# Patient Record
Sex: FEMALE | Race: WHITE | NOT HISPANIC OR LATINO | Employment: OTHER
[De-identification: names, ages, dates, MRNs, and addresses within clinical notes are randomized per-mention and may not be internally consistent; named-entity substitution may affect disease eponyms.]

---

## 2017-01-17 ENCOUNTER — RECORDS - HEALTHEAST (OUTPATIENT)
Dept: ADMINISTRATIVE | Facility: OTHER | Age: 57
End: 2017-01-17

## 2017-01-17 ENCOUNTER — RECORDS - HEALTHEAST (OUTPATIENT)
Dept: LAB | Facility: CLINIC | Age: 57
End: 2017-01-17

## 2017-01-17 LAB
CHOLEST SERPL-MCNC: 178 MG/DL
FASTING STATUS PATIENT QL REPORTED: NORMAL
HDLC SERPL-MCNC: 56 MG/DL
LDLC SERPL CALC-MCNC: 104 MG/DL
TRIGL SERPL-MCNC: 90 MG/DL

## 2017-01-31 ENCOUNTER — RECORDS - HEALTHEAST (OUTPATIENT)
Dept: ADMINISTRATIVE | Facility: OTHER | Age: 57
End: 2017-01-31

## 2017-09-05 ENCOUNTER — RECORDS - HEALTHEAST (OUTPATIENT)
Dept: ADMINISTRATIVE | Facility: OTHER | Age: 57
End: 2017-09-05

## 2017-11-15 ENCOUNTER — RECORDS - HEALTHEAST (OUTPATIENT)
Dept: ADMINISTRATIVE | Facility: OTHER | Age: 57
End: 2017-11-15

## 2017-12-06 ENCOUNTER — RECORDS - HEALTHEAST (OUTPATIENT)
Dept: LAB | Facility: CLINIC | Age: 57
End: 2017-12-06

## 2017-12-06 ENCOUNTER — RECORDS - HEALTHEAST (OUTPATIENT)
Dept: ADMINISTRATIVE | Facility: OTHER | Age: 57
End: 2017-12-06

## 2017-12-06 LAB
CHOLEST SERPL-MCNC: 202 MG/DL
FASTING STATUS PATIENT QL REPORTED: NO
HDLC SERPL-MCNC: 59 MG/DL
LDLC SERPL CALC-MCNC: 114 MG/DL
TRIGL SERPL-MCNC: 144 MG/DL

## 2018-01-02 ENCOUNTER — RECORDS - HEALTHEAST (OUTPATIENT)
Dept: ADMINISTRATIVE | Facility: OTHER | Age: 58
End: 2018-01-02

## 2018-04-27 ENCOUNTER — RECORDS - HEALTHEAST (OUTPATIENT)
Dept: ADMINISTRATIVE | Facility: OTHER | Age: 58
End: 2018-04-27

## 2018-04-30 ENCOUNTER — RECORDS - HEALTHEAST (OUTPATIENT)
Dept: ADMINISTRATIVE | Facility: OTHER | Age: 58
End: 2018-04-30

## 2018-05-03 ENCOUNTER — RECORDS - HEALTHEAST (OUTPATIENT)
Dept: ADMINISTRATIVE | Facility: OTHER | Age: 58
End: 2018-05-03

## 2018-05-11 ENCOUNTER — HOSPITAL ENCOUNTER (OUTPATIENT)
Dept: CARDIOLOGY | Facility: CLINIC | Age: 58
Discharge: HOME | End: 2018-05-11

## 2018-05-11 ENCOUNTER — HOSPITAL ENCOUNTER (OUTPATIENT)
Dept: NUCLEAR MEDICINE | Facility: CLINIC | Age: 58
Discharge: HOME | End: 2018-05-11

## 2018-05-11 ENCOUNTER — RECORDS - HEALTHEAST (OUTPATIENT)
Dept: ADMINISTRATIVE | Facility: OTHER | Age: 58
End: 2018-05-11

## 2018-05-11 DIAGNOSIS — R07.9 CHEST PAIN, UNSPECIFIED TYPE: ICD-10-CM

## 2018-05-11 LAB
CV STRESS CURRENT BP HE: NORMAL
CV STRESS CURRENT HR HE: 101
CV STRESS CURRENT HR HE: 104
CV STRESS CURRENT HR HE: 112
CV STRESS CURRENT HR HE: 119
CV STRESS CURRENT HR HE: 119
CV STRESS CURRENT HR HE: 124
CV STRESS CURRENT HR HE: 132
CV STRESS CURRENT HR HE: 137
CV STRESS CURRENT HR HE: 137
CV STRESS CURRENT HR HE: 143
CV STRESS CURRENT HR HE: 143
CV STRESS CURRENT HR HE: 144
CV STRESS CURRENT HR HE: 144
CV STRESS CURRENT HR HE: 145
CV STRESS CURRENT HR HE: 148
CV STRESS CURRENT HR HE: 66
CV STRESS CURRENT HR HE: 75
CV STRESS CURRENT HR HE: 78
CV STRESS CURRENT HR HE: 79
CV STRESS CURRENT HR HE: 79
CV STRESS CURRENT HR HE: 81
CV STRESS CURRENT HR HE: 87
CV STRESS CURRENT HR HE: 89
CV STRESS CURRENT HR HE: 93
CV STRESS CURRENT HR HE: 98
CV STRESS DEVIATION TIME HE: NORMAL
CV STRESS ECHO PERCENT HR HE: NORMAL
CV STRESS EXERCISE STAGE HE: NORMAL
CV STRESS EXERCISE STAGE REACHED HE: NORMAL
CV STRESS FINAL RESTING BP HE: NORMAL
CV STRESS FINAL RESTING HR HE: 79
CV STRESS MAX HR HE: 148
CV STRESS MAX TREADMILL GRADE HE: 14
CV STRESS MAX TREADMILL SPEED HE: 3.4
CV STRESS PEAK DIA BP HE: NORMAL
CV STRESS PEAK SYS BP HE: NORMAL
CV STRESS PHASE HE: NORMAL
CV STRESS PROTOCOL HE: NORMAL
CV STRESS RESTING PT POSITION HE: NORMAL
CV STRESS RESTING PT POSITION HE: NORMAL
CV STRESS ST DEVIATION AMOUNT HE: NORMAL
CV STRESS ST DEVIATION ELEVATION HE: NORMAL
CV STRESS ST EVELATION AMOUNT HE: NORMAL
CV STRESS TEST TYPE HE: NORMAL
CV STRESS TOTAL STAGE TIME MIN 1 HE: NORMAL
NUC STRESS EJECTION FRACTION: 78 %
STRESS ECHO BASELINE BP: NORMAL
STRESS ECHO BASELINE HR: 65
STRESS ECHO CALCULATED PERCENT HR: 92 %
STRESS ECHO LAST STRESS BP: NORMAL
STRESS ECHO LAST STRESS HR: 145
STRESS ECHO POST ESTIMATED WORKLOAD: 7.3
STRESS ECHO POST EXERCISE DUR MIN: 6
STRESS ECHO POST EXERCISE DUR SEC: 1
STRESS ECHO TARGET HR: 137

## 2018-06-06 ENCOUNTER — RECORDS - HEALTHEAST (OUTPATIENT)
Dept: ADMINISTRATIVE | Facility: OTHER | Age: 58
End: 2018-06-06

## 2018-09-10 ENCOUNTER — RECORDS - HEALTHEAST (OUTPATIENT)
Dept: ADMINISTRATIVE | Facility: OTHER | Age: 58
End: 2018-09-10

## 2019-08-15 ENCOUNTER — RECORDS - HEALTHEAST (OUTPATIENT)
Dept: ADMINISTRATIVE | Facility: OTHER | Age: 59
End: 2019-08-15

## 2020-01-04 ENCOUNTER — RECORDS - HEALTHEAST (OUTPATIENT)
Dept: ADMINISTRATIVE | Facility: OTHER | Age: 60
End: 2020-01-04

## 2020-01-06 ENCOUNTER — COMMUNICATION - HEALTHEAST (OUTPATIENT)
Dept: SCHEDULING | Facility: CLINIC | Age: 60
End: 2020-01-06

## 2020-01-08 ENCOUNTER — RECORDS - HEALTHEAST (OUTPATIENT)
Dept: ADMINISTRATIVE | Facility: OTHER | Age: 60
End: 2020-01-08

## 2020-01-29 ENCOUNTER — OFFICE VISIT - HEALTHEAST (OUTPATIENT)
Dept: FAMILY MEDICINE | Facility: CLINIC | Age: 60
End: 2020-01-29

## 2020-01-29 DIAGNOSIS — Z12.11 SCREEN FOR COLON CANCER: ICD-10-CM

## 2020-01-29 DIAGNOSIS — L42 PITYRIASIS ROSEA: ICD-10-CM

## 2020-01-29 DIAGNOSIS — J40 BRONCHITIS: ICD-10-CM

## 2020-01-29 DIAGNOSIS — Z12.31 VISIT FOR SCREENING MAMMOGRAM: ICD-10-CM

## 2020-01-29 ASSESSMENT — MIFFLIN-ST. JEOR: SCORE: 1183.21

## 2020-01-29 NOTE — ASSESSMENT & PLAN NOTE
Underlying cause and expected course discussed.  If any itching develop she will contact clinic and will discuss options

## 2020-03-27 ENCOUNTER — COMMUNICATION - HEALTHEAST (OUTPATIENT)
Dept: FAMILY MEDICINE | Facility: CLINIC | Age: 60
End: 2020-03-27

## 2020-03-27 DIAGNOSIS — J30.1 SEASONAL ALLERGIC RHINITIS DUE TO POLLEN: ICD-10-CM

## 2020-03-27 NOTE — ASSESSMENT & PLAN NOTE
Possible asthmatic component.  Will treat allergies with the Flonase.  The cough which has been responding to albuterol will move to astep up therapy as if it is asthma.  Will check back in with patient in 2 weeks and if it is now controlled will use as working diagnosis for asthma at that time.  Given the current viral pandemic though further allergy testing at this time or spirometry is not advised thus the treatment regimen without the normal testing.

## 2020-04-09 ENCOUNTER — COMMUNICATION - HEALTHEAST (OUTPATIENT)
Dept: FAMILY MEDICINE | Facility: CLINIC | Age: 60
End: 2020-04-09

## 2020-04-09 DIAGNOSIS — J40 BRONCHITIS: ICD-10-CM

## 2020-04-27 ENCOUNTER — COMMUNICATION - HEALTHEAST (OUTPATIENT)
Dept: FAMILY MEDICINE | Facility: CLINIC | Age: 60
End: 2020-04-27

## 2020-04-27 DIAGNOSIS — J30.1 SEASONAL ALLERGIC RHINITIS DUE TO POLLEN: ICD-10-CM

## 2020-05-19 ENCOUNTER — COMMUNICATION - HEALTHEAST (OUTPATIENT)
Dept: FAMILY MEDICINE | Facility: CLINIC | Age: 60
End: 2020-05-19

## 2020-05-19 DIAGNOSIS — E78.5 HYPERLIPIDEMIA, UNSPECIFIED HYPERLIPIDEMIA TYPE: ICD-10-CM

## 2020-05-19 DIAGNOSIS — L42 PITYRIASIS ROSEA: ICD-10-CM

## 2020-05-19 DIAGNOSIS — I10 ESSENTIAL HYPERTENSION: ICD-10-CM

## 2020-05-28 ENCOUNTER — COMMUNICATION - HEALTHEAST (OUTPATIENT)
Dept: FAMILY MEDICINE | Facility: CLINIC | Age: 60
End: 2020-05-28

## 2020-05-28 DIAGNOSIS — J30.1 SEASONAL ALLERGIC RHINITIS DUE TO POLLEN: ICD-10-CM

## 2020-07-08 ENCOUNTER — HOSPITAL ENCOUNTER (OUTPATIENT)
Dept: MAMMOGRAPHY | Facility: CLINIC | Age: 60
Discharge: HOME | End: 2020-07-08
Attending: FAMILY MEDICINE

## 2020-07-08 ENCOUNTER — COMMUNICATION - HEALTHEAST (OUTPATIENT)
Dept: SCHEDULING | Facility: CLINIC | Age: 60
End: 2020-07-08

## 2020-07-08 DIAGNOSIS — Z12.31 VISIT FOR SCREENING MAMMOGRAM: ICD-10-CM

## 2020-07-09 ENCOUNTER — COMMUNICATION - HEALTHEAST (OUTPATIENT)
Dept: SCHEDULING | Facility: CLINIC | Age: 60
End: 2020-07-09

## 2020-07-27 ENCOUNTER — OFFICE VISIT - HEALTHEAST (OUTPATIENT)
Dept: FAMILY MEDICINE | Facility: CLINIC | Age: 60
End: 2020-07-27

## 2020-07-27 DIAGNOSIS — E78.5 HYPERLIPIDEMIA, UNSPECIFIED HYPERLIPIDEMIA TYPE: ICD-10-CM

## 2020-07-27 DIAGNOSIS — Z13.220 ENCOUNTER FOR SCREENING FOR LIPOID DISORDERS: ICD-10-CM

## 2020-07-27 DIAGNOSIS — Z13.1 ENCOUNTER FOR SCREENING FOR DIABETES MELLITUS: ICD-10-CM

## 2020-07-27 DIAGNOSIS — Z12.11 SCREENING FOR COLON CANCER: ICD-10-CM

## 2020-07-27 DIAGNOSIS — Z11.4 SCREENING FOR HIV WITHOUT PRESENCE OF RISK FACTORS: ICD-10-CM

## 2020-07-27 DIAGNOSIS — Z11.59 ENCOUNTER FOR HEPATITIS C SCREENING TEST FOR LOW RISK PATIENT: ICD-10-CM

## 2020-07-27 DIAGNOSIS — I10 ESSENTIAL HYPERTENSION: ICD-10-CM

## 2020-07-27 LAB
ANION GAP SERPL CALCULATED.3IONS-SCNC: 11 MMOL/L (ref 5–18)
AST SERPL W P-5'-P-CCNC: 23 U/L (ref 0–40)
BUN SERPL-MCNC: 17 MG/DL (ref 8–22)
CALCIUM SERPL-MCNC: 9.8 MG/DL (ref 8.5–10.5)
CHLORIDE BLD-SCNC: 105 MMOL/L (ref 98–107)
CHOLEST SERPL-MCNC: 237 MG/DL
CO2 SERPL-SCNC: 25 MMOL/L (ref 22–31)
CREAT SERPL-MCNC: 0.75 MG/DL (ref 0.6–1.1)
FASTING STATUS PATIENT QL REPORTED: YES
GFR SERPL CREATININE-BSD FRML MDRD: >60 ML/MIN/1.73M2
GLUCOSE BLD-MCNC: 96 MG/DL (ref 70–125)
HDLC SERPL-MCNC: 59 MG/DL
HIV 1+2 AB+HIV1 P24 AG SERPL QL IA: NEGATIVE
LDLC SERPL CALC-MCNC: 129 MG/DL
POTASSIUM BLD-SCNC: 4.8 MMOL/L (ref 3.5–5)
SODIUM SERPL-SCNC: 141 MMOL/L (ref 136–145)
TRIGL SERPL-MCNC: 246 MG/DL

## 2020-07-27 RX ORDER — LOSARTAN POTASSIUM 100 MG/1
100 TABLET ORAL DAILY
Qty: 90 TABLET | Refills: 3 | Status: SHIPPED | OUTPATIENT
Start: 2020-07-27 | End: 2021-07-28

## 2020-07-27 ASSESSMENT — MIFFLIN-ST. JEOR: SCORE: 1209.41

## 2020-07-27 NOTE — ASSESSMENT & PLAN NOTE
Not fully controlled and having cough from lisinopril.  As such we will move over to losartan and increase the equivalent dose to 100mg daily.  Follow-up in 2 weeks for nurse visit for recheck of blood pressure with goal of less than 140/90 in office.  Side effects and precautions reviewed.

## 2020-07-27 NOTE — ASSESSMENT & PLAN NOTE
Tolerating simvastatin 40 mg daily well.  We will continue at current dosing.  Will check lipid profile as per guidance as well as AST.

## 2020-07-28 ENCOUNTER — AMBULATORY - HEALTHEAST (OUTPATIENT)
Dept: SURGERY | Facility: AMBULATORY SURGERY CENTER | Age: 60
End: 2020-07-28

## 2020-07-28 DIAGNOSIS — Z11.59 ENCOUNTER FOR SCREENING FOR OTHER VIRAL DISEASES: ICD-10-CM

## 2020-07-28 LAB — HCV AB SERPL QL IA: NEGATIVE

## 2020-08-10 ENCOUNTER — AMBULATORY - HEALTHEAST (OUTPATIENT)
Dept: NURSING | Facility: CLINIC | Age: 60
End: 2020-08-10

## 2020-08-10 DIAGNOSIS — I10 ESSENTIAL HYPERTENSION: ICD-10-CM

## 2020-08-10 RX ORDER — CETIRIZINE HYDROCHLORIDE 10 MG/1
10 TABLET ORAL DAILY
Status: SHIPPED | COMMUNITY
Start: 2020-08-10

## 2020-09-14 ENCOUNTER — OFFICE VISIT - HEALTHEAST (OUTPATIENT)
Dept: FAMILY MEDICINE | Facility: CLINIC | Age: 60
End: 2020-09-14

## 2020-09-14 ENCOUNTER — COMMUNICATION - HEALTHEAST (OUTPATIENT)
Dept: SCHEDULING | Facility: CLINIC | Age: 60
End: 2020-09-14

## 2020-09-14 ENCOUNTER — COMMUNICATION - HEALTHEAST (OUTPATIENT)
Dept: FAMILY MEDICINE | Facility: CLINIC | Age: 60
End: 2020-09-14

## 2020-09-14 DIAGNOSIS — L30.8 OTHER ECZEMA: ICD-10-CM

## 2020-09-14 DIAGNOSIS — T63.441A BEE STING REACTION, ACCIDENTAL OR UNINTENTIONAL, INITIAL ENCOUNTER: ICD-10-CM

## 2020-09-14 RX ORDER — HYDROCORTISONE 2.5 %
CREAM (GRAM) TOPICAL
Qty: 30 G | Refills: 3 | Status: SHIPPED | OUTPATIENT
Start: 2020-09-14 | End: 2021-07-28

## 2020-10-22 ENCOUNTER — COMMUNICATION - HEALTHEAST (OUTPATIENT)
Dept: FAMILY MEDICINE | Facility: CLINIC | Age: 60
End: 2020-10-22

## 2020-10-26 ENCOUNTER — AMBULATORY - HEALTHEAST (OUTPATIENT)
Dept: SURGERY | Facility: AMBULATORY SURGERY CENTER | Age: 60
End: 2020-10-26

## 2020-10-26 DIAGNOSIS — Z11.59 ENCOUNTER FOR SCREENING FOR OTHER VIRAL DISEASES: ICD-10-CM

## 2020-10-27 ENCOUNTER — RECORDS - HEALTHEAST (OUTPATIENT)
Dept: ADMINISTRATIVE | Facility: OTHER | Age: 60
End: 2020-10-27

## 2020-12-10 ENCOUNTER — COMMUNICATION - HEALTHEAST (OUTPATIENT)
Dept: FAMILY MEDICINE | Facility: CLINIC | Age: 60
End: 2020-12-10

## 2020-12-10 DIAGNOSIS — I10 ESSENTIAL HYPERTENSION: ICD-10-CM

## 2020-12-10 DIAGNOSIS — E78.5 HYPERLIPIDEMIA, UNSPECIFIED HYPERLIPIDEMIA TYPE: ICD-10-CM

## 2021-01-26 ENCOUNTER — AMBULATORY - HEALTHEAST (OUTPATIENT)
Dept: NURSING | Facility: CLINIC | Age: 61
End: 2021-01-26

## 2021-02-15 ENCOUNTER — OFFICE VISIT - HEALTHEAST (OUTPATIENT)
Dept: FAMILY MEDICINE | Facility: CLINIC | Age: 61
End: 2021-02-15

## 2021-02-15 DIAGNOSIS — E78.5 HYPERLIPIDEMIA, UNSPECIFIED HYPERLIPIDEMIA TYPE: ICD-10-CM

## 2021-02-15 DIAGNOSIS — I10 ESSENTIAL HYPERTENSION: ICD-10-CM

## 2021-02-15 DIAGNOSIS — Z12.11 SCREENING FOR COLON CANCER: ICD-10-CM

## 2021-02-15 ASSESSMENT — MIFFLIN-ST. JEOR: SCORE: 1236.17

## 2021-02-15 NOTE — ASSESSMENT & PLAN NOTE
Not fully controlled below 140/90.  Discussed options of increasing exercise to more routine versus dose adjustment to include addingin amlodipine at 2.5 mg daily.  Discussed strategies for keeping exercise consistent as well as getting in a minimum of 15 to 30 minutes daily of mild to moderate activity.  Patient would like avoid adding extra medications thus will move to routine exercise.  Stressed the fact that this is routine exercise and this is now a prescription for control of her high blood pressure.  Follow-up in 4 months.  At that time if not consistent withexercise or not seeing the response in blood pressure to below goal of 140/90 then will look at starting amlodipine 2.5 mg daily.

## 2021-02-15 NOTE — ASSESSMENT & PLAN NOTE
Tolerating Zocor at current dosing.  Will continue at 40 mg daily.  Will recheck levels at annual physical and hypertension follow-upin 4 months.

## 2021-02-26 ENCOUNTER — AMBULATORY - HEALTHEAST (OUTPATIENT)
Dept: SURGERY | Facility: AMBULATORY SURGERY CENTER | Age: 61
End: 2021-02-26

## 2021-02-26 DIAGNOSIS — Z11.59 ENCOUNTER FOR SCREENING FOR OTHER VIRAL DISEASES: ICD-10-CM

## 2021-03-18 ASSESSMENT — MIFFLIN-ST. JEOR: SCORE: 1174.94

## 2021-03-21 ENCOUNTER — AMBULATORY - HEALTHEAST (OUTPATIENT)
Dept: FAMILY MEDICINE | Facility: CLINIC | Age: 61
End: 2021-03-21

## 2021-03-21 DIAGNOSIS — Z11.59 ENCOUNTER FOR SCREENING FOR OTHER VIRAL DISEASES: ICD-10-CM

## 2021-03-22 ENCOUNTER — ANESTHESIA - HEALTHEAST (OUTPATIENT)
Dept: SURGERY | Facility: AMBULATORY SURGERY CENTER | Age: 61
End: 2021-03-22

## 2021-03-22 LAB
SARS-COV-2 PCR COMMENT: NORMAL
SARS-COV-2 RNA SPEC QL NAA+PROBE: NEGATIVE
SARS-COV-2 VIRUS SPECIMEN SOURCE: NORMAL

## 2021-03-23 ENCOUNTER — SURGERY - HEALTHEAST (OUTPATIENT)
Dept: SURGERY | Facility: AMBULATORY SURGERY CENTER | Age: 61
End: 2021-03-23

## 2021-03-23 ENCOUNTER — COMMUNICATION - HEALTHEAST (OUTPATIENT)
Dept: SCHEDULING | Facility: CLINIC | Age: 61
End: 2021-03-23

## 2021-03-26 ENCOUNTER — COMMUNICATION - HEALTHEAST (OUTPATIENT)
Dept: SURGERY | Facility: CLINIC | Age: 61
End: 2021-03-26

## 2021-05-24 ENCOUNTER — RECORDS - HEALTHEAST (OUTPATIENT)
Dept: ADMINISTRATIVE | Facility: CLINIC | Age: 61
End: 2021-05-24

## 2021-05-26 ENCOUNTER — RECORDS - HEALTHEAST (OUTPATIENT)
Dept: ADMINISTRATIVE | Facility: CLINIC | Age: 61
End: 2021-05-26

## 2021-05-26 VITALS
HEART RATE: 65 BPM | TEMPERATURE: 97.8 F | DIASTOLIC BLOOD PRESSURE: 76 MMHG | SYSTOLIC BLOOD PRESSURE: 139 MMHG | OXYGEN SATURATION: 98 % | RESPIRATION RATE: 12 BRPM

## 2021-05-27 ENCOUNTER — RECORDS - HEALTHEAST (OUTPATIENT)
Dept: ADMINISTRATIVE | Facility: CLINIC | Age: 61
End: 2021-05-27

## 2021-05-27 VITALS — DIASTOLIC BLOOD PRESSURE: 89 MMHG | SYSTOLIC BLOOD PRESSURE: 174 MMHG | HEART RATE: 68 BPM

## 2021-05-29 ENCOUNTER — RECORDS - HEALTHEAST (OUTPATIENT)
Dept: ADMINISTRATIVE | Facility: CLINIC | Age: 61
End: 2021-05-29

## 2021-06-04 VITALS
RESPIRATION RATE: 12 BRPM | SYSTOLIC BLOOD PRESSURE: 152 MMHG | DIASTOLIC BLOOD PRESSURE: 80 MMHG | HEART RATE: 68 BPM | WEIGHT: 152.6 LBS | BODY MASS INDEX: 28.08 KG/M2 | HEIGHT: 62 IN | TEMPERATURE: 97.3 F

## 2021-06-04 VITALS
OXYGEN SATURATION: 96 % | SYSTOLIC BLOOD PRESSURE: 108 MMHG | RESPIRATION RATE: 16 BRPM | HEART RATE: 72 BPM | BODY MASS INDEX: 27.18 KG/M2 | DIASTOLIC BLOOD PRESSURE: 78 MMHG | HEIGHT: 62 IN | TEMPERATURE: 98.6 F | WEIGHT: 147.7 LBS

## 2021-06-05 VITALS
SYSTOLIC BLOOD PRESSURE: 142 MMHG | DIASTOLIC BLOOD PRESSURE: 86 MMHG | HEART RATE: 64 BPM | TEMPERATURE: 98.3 F | WEIGHT: 158.5 LBS | BODY MASS INDEX: 29.17 KG/M2 | HEIGHT: 62 IN | RESPIRATION RATE: 12 BRPM

## 2021-06-05 VITALS — WEIGHT: 145 LBS | HEIGHT: 62 IN | BODY MASS INDEX: 26.68 KG/M2

## 2021-06-05 NOTE — PROGRESS NOTES
Assessment/Plan:     Problem List Items Addressed This Visit     Pityriasis rosea     Underlying cause and expected course discussed.  If any itching develop she will contact clinic and will discuss options           Other Visit Diagnoses     Bronchitis    -  Primary    With length of symptoms, will move to empiric treatment.  Continue albuterol and Tessalon Perles as well as over-the-counter cough suppressants.    Relevant Medications    PROAIR HFA 90 mcg/actuation inhaler    doxycycline (VIBRA-TABS) 100 MG tablet    Visit for screening mammogram        Relevant Orders    Mammo Screening Bilateral    Screen for colon cancer        Relevant Orders    Ambulatory referral for Colonoscopy        Return in about 4 months (around 5/29/2020) for Annual physical.    Subjective:   59 y.o. female presents for rash and continued cough.  She is new to clinic.  Patient has had a cough that is worse at night but also present during the day since the beginning of the month.  She went through treatment of amoxicillin.  And things were taken to get better until about 5 days ago when they started to get worse again.  Drainage down the back of the throat.  Some mild pressure in the facial region from what she describes as drainage.  No wheezing.  She has not really tried the albuterol is it does not feel like is getting into her lungs.  She does have Tessalon Perles which when she takes it does improve.  Also Delsym seems to help some.  No nausea or vomiting.  This rash started as something underneath her bra line that she feared was just from her bra, then a few days ago started breaking out with smaller similar spots across her torso and back.  They do not itch.  Her  had something similar as well.  No recent travel.  She is a schoolteacher working for  and first grade.          Review of Systems   All other systems reviewed and are negative.       History     Reviewed By Date/Time Sections Reviewed    [place],  "Porter Scott, DO 1/29/2020 10:30 AM Medical, Surgical, Tobacco, Family, Socioeconomic    Manjinder Alanis Jr., [facility] 1/29/2020 10:22 AM Family    Manjinder Alanis Jr., [facility] 1/29/2020 10:21 AM Surgical, Family    Manjinder Alanis Jr., [facility] 1/29/2020 10:20 AM Medical    Manjinder Alanis Jr., [facility] 1/29/2020 10:14 AM Socioeconomic    Manjinder Alanis Jr., [facility] 1/29/2020 10:13 AM Tobacco, Alcohol, Drug Use, Sexual Activity           Objective:     Vitals:    01/29/20 1010   BP: 108/78   Pulse: 72   Resp: 16   Temp: 98.6  F (37  C)   TempSrc: Oral   SpO2: 96%   Weight: 147 lb 11.2 oz (67 kg)   Height: 5' 2\" (1.575 m)     Physical Exam  Vitals signs and nursing note reviewed.   Constitutional:       General: She is not in acute distress.     Appearance: Normal appearance.   HENT:      Head: Normocephalic and atraumatic.      Right Ear: Tympanic membrane, ear canal and external ear normal.      Left Ear: Tympanic membrane, ear canal and external ear normal.      Nose: Congestion and rhinorrhea present.      Mouth/Throat:      Pharynx: Posterior oropharyngeal erythema present. No oropharyngeal exudate.   Eyes:      Extraocular Movements: Extraocular movements intact.      Conjunctiva/sclera: Conjunctivae normal.   Neck:      Musculoskeletal: Normal range of motion and neck supple.   Cardiovascular:      Rate and Rhythm: Normal rate and regular rhythm.      Pulses: Normal pulses.      Heart sounds: Normal heart sounds.   Pulmonary:      Effort: Pulmonary effort is normal.      Breath sounds: Normal breath sounds. No wheezing, rhonchi or rales.   Musculoskeletal:      Right lower leg: No edema.   Lymphadenopathy:      Cervical: No cervical adenopathy.   Skin:     Capillary Refill: Capillary refill takes less than 2 seconds.   Neurological:      Mental Status: She is alert and oriented to person, place, and time.   Psychiatric:         Mood and Affect: Mood normal.         This note has been dictated using voice recognition " software. Any grammatical or context distortions are unintentional and inherent to the software

## 2021-06-07 NOTE — TELEPHONE ENCOUNTER
RN cannot approve Refill Request    RN can NOT refill this medication med is not covered by policy/route to provider           Sarai Nelson, [facility] Connection Triage/Med Refill 4/28/2020    Requested Prescriptions   Pending Prescriptions Disp Refills     fluticasone propionate (FLONASE) 50 mcg/actuation nasal spray [Pharmacy Med Name: FLUTICASONE PROP 50 MCG SPRAY] 16 g 2     Sig: PLACE 1 SPRAY INTO EACH NOSTRIL 2 TIMES A DAY AT 6:00 AM AND 4:00 PM.       Nasal Steroid Refill Protocol Passed - 4/27/2020 12:32 PM        Passed - Patient has had office visit/physical in last 2 years     Last office visit with prescriber/PCP: 1/29/2020 OR same dept: 1/29/2020 Porter Morales DO OR same specialty: 1/29/2020 Porter Morales DO Last physical: Visit date not found Last MTM visit: Visit date not found    Next appt within 3 mo: Visit date not found  Next physical within 3 mo: Visit date not found  Prescriber OR PCP: Porter Morales DO  Last diagnosis associated with med order: 1. Seasonal allergic rhinitis due to pollen  - fluticasone propionate (FLONASE) 50 mcg/actuation nasal spray [Pharmacy Med Name: FLUTICASONE PROP 50 MCG SPRAY]; PLACE 1 SPRAY INTO EACH NOSTRIL 2 TIMES A DAY AT 6:00 AM AND 4:00 PM.  Dispense: 16 g; Refill: 2  - FLOVENT  mcg/actuation inhaler [Pharmacy Med Name: FLOVENT  MCG INHALER]; TAKE 2 PUFFS BY MOUTH TWICE A DAY  Dispense: 12 Inhaler; Refill: 2     If protocol passes may refill for 12 months if within 3 months of last provider visit (or a total of 15 months).                 FLOVENT  mcg/actuation inhaler [Pharmacy Med Name: FLOVENT  MCG INHALER] 12 Inhaler 2     Sig: TAKE 2 PUFFS BY MOUTH TWICE A DAY       There is no refill protocol information for this order

## 2021-06-07 NOTE — TELEPHONE ENCOUNTER
Problem List Items Addressed This Visit     Seasonal allergic rhinitis due to pollen     Possible asthmatic component.  Will treat allergies with the Flonase.  The cough which has been responding to albuterol will move to a step up therapy as if it is asthma.  Will check back in with patient in 2 weeks and if it is now controlled will use as working diagnosis for asthma at that time.  Given the current viral pandemic though further allergy testing at this time or spirometry is not advised thus the treatment regimen without the normal testing.         Relevant Medications    fluticasone propionate (FLONASE) 50 mcg/actuation nasal spray    fluticasone propionate (FLOVENT HFA) 110 mcg/actuation inhaler    albuterol (PROAIR HFA;PROVENTIL HFA;VENTOLIN HFA) 90 mcg/actuation inhaler        Called and spoke with patient.  Still having cough.  It comes up at night and during the day.  Forceful hacking cough that has trouble catching breath and gags.  She has had this basically on and off throughout her life.  She mainly notices it when the season changes.  She has tried some different over-the-counter allergy treatments before without success.  She was given albuterol for bronchitis earlier this year and she is found that when she uses that it calms down her cough significantly.

## 2021-06-07 NOTE — TELEPHONE ENCOUNTER
Medication Request    Medication name:   PROAIR HFA 90 mcg/actuation inhaler    1/8/2020   Yes    Sig - Route: Inhale 2 puffs daily as needed     Historical med request.    Requested Pharmacy: [id number] - [place], [place] - [address]      Reason for request: Cough    When did you use medication last?:  03/27/2020    Patient offered appointment:  patient declined     Okay to leave a detailed message: yes    Please send script to patients pharmacy and inform the patient of the outcome to this request.

## 2021-06-08 NOTE — TELEPHONE ENCOUNTER
"New patient to you on 1/29/20.  Please advise if you would like a virtual visit to discuss these meds, and/or if refill is appropriate.  Both meds listed as \"historical\".     Winsome Reed CMA 5/19/2020 10:07 AM   [place] CSS    "

## 2021-06-08 NOTE — TELEPHONE ENCOUNTER
Medication Request  Medication name: simvastatin (ZOCOR) 40 MG tablet  lisinopril (PRINIVIL,ZESTRIL) 10 MG tablet  [facility]ed [facility]  Reason for request: routine   When did you use medication last?:  today  Patient offered appointment:  yes  Okay to leave a detailed message: yes

## 2021-06-09 NOTE — TELEPHONE ENCOUNTER
[place]. [place] and got chiggar bites on her chest.   She is wondering if she should still go to her mammogram today.    Patient advised that she could ask at the desk if they will still do the mammogram.    She agreed with POC.    Sandra Kaiser RN  Care Connection Triage/refill nurse    Reason for Disposition    Information only question and nurse able to answer    Protocols used: NO PROTOCOL AVAILABLE - INFORMATION ONLY-A-[place]

## 2021-06-09 NOTE — TELEPHONE ENCOUNTER
Patient calls today about mild rash on torso. She states [place] She first noticed the rash on Monday. The rash is described as pinkish red patches on torso and breasts. Denies any small bumps or pustules. Denies red streaks or purple/blood colored spots. Denies red-ring or bullseye rash. Denies fevers or headaches. The rash is not painful. It is mildly itchy. The rash has stayed the same since onset. Has not seen noticeable improvement, however the rash has not gotten worse. Denies that the rash has spread to other areas of the body. Denies trouble breathing. Denies eating new foods or taking new medications. Uncertain of cause of rash. Patient states she takes a daily allergy pill but that has not helped. She also has done a bath with epsom salts.    Patient is concerned about shingles. I reassured her that the symptoms she is describing do not sound consistent with shingles. Shingles usually has patches of small, painful bumps or blisters. Usually is accompanied by tingling or burning pain. Furthermore patient received zoster vaccines in 2018.     I recommended treating rash at home. Most mild rashes resolve on their own within 1 week. Patient can try taking a benadryl. Should also continue to moisturize skin. She can also use OTC hydrocortisone 1% on rash areas. If she does not see improvement in rash after using care advice for a few days she should call back. Should call back sooner with new or worsening symptoms such as spreading of the rash, fevers, trouble breathing, blisters or streaking, or headaches. Verbalizes understanding of nurse advice. No additional questions at this time.    Kanwal Sanders RN    Reason for Disposition    Mild localized rash    Additional Information    Negative: [1] Sudden onset of rash (within last 2 hours) AND [2] difficulty with breathing or swallowing    Negative: Sounds like a life-threatening emergency to the triager    Negative:  Poison ivy, oak, or sumac contact suspected    Negative: Insect bite(s) suspected    Negative: Ringworm suspected (i.e., round pink patch, sometimes looks like ring, usually 1/2 to 1 inch [12-25 mm],  in size, slowly increasing in size)    Negative: Athlete's Foot suspected (i.e., itchy rash between the toes)    Negative: Jock Itch suspected (i.e., itchy rash on inner thighs near genital area)    Negative: Wound infection suspected (i.e., pain, spreading redness, or pus; in a cut, puncture, scrape or sutured wound)    Negative: Impetigo suspected  (i.e., painless infected superficial small sores, less than 1 inch or 2.5 cm, often covered by a soft, yellow-brown scab or crust; sometimes occurring near nasal openings)    Negative: Shingles suspected (i.e., painful rash, multiple small blisters grouped together in one area of body; dermatomal distribution)    Negative: Rash of external female genital area (vulva)    Negative: Rash of penis or scrotum    Negative: Small spot, skin growth, or mole    Negative: Sores or skin ulcer, not a rash    Negative: Localized lump (or swelling) without redness or rash    Negative: Patient sounds very sick or weak to the triager    Negative: [1] Red area or streak AND [2] fever    Negative: [1] Rash is painful to touch AND [2] fever    Negative: [1] Looks infected (spreading redness, pus) AND [2] large red area (> 2 in. or 5 cm)    Negative: [1] Looks infected (spreading redness, pus) AND [2] diabetes mellitus or weak immune system (e.g., HIV positive, cancer chemo, splenectomy, organ transplant, chronic steroids)    Negative: [1] Localized purple or blood-colored spots or dots AND [2] not from injury or friction AND [3] no fever    Negative: [1] Looks infected (spreading redness, pus) AND [2] no fever    Negative: Looks like a boil, infected sore, deep ulcer or other infected rash    Negative: [1] Localized rash is very painful AND [2] no fever    Negative: Genital area rash     Negative: Lyme disease suspected (e.g., bull's eye rash or tick bite / exposure)    Negative: [1] Applying cream or ointment AND [2] causes severe itch, burning or pain    Negative: [1] Pimples (localized) AND [2 ] no improvement after using CARE ADVICE    Negative: Tender bumps in armpits    Negative: [1] Severe localized itching AND [2] after 2 days of steroid cream    Negative: Localized rash present > 7 days    Negative: Red, moist, irritated area between skin folds (or under larger breasts)    Negative: [1] Localized area of skin darkening or thicken on lower legs or ankles AND [2] has NOT been evaluated by a doctor (or NP/PA)    Protocols used: RASH OR REDNESS - [id number]

## 2021-06-10 NOTE — PROGRESS NOTES
Follow Up Blood Pressure Check    Ashleigh Chirinos is a 60 y.o. female recommended to follow up for blood pressure check by Porter Morales DO. Anihypertensive medications and adherence were verified: Yes.     Reason for visit: Not fully controlled and having cough from lisinopril.  As such we will move over to losartan and increase the equivalent dose to 100 mg daily.  Follow-up in 2 weeks for nurse visit for recheck of blood pressure with goal of less than 140/90 in office.  Side effects and precautions reviewed.   08/10/20- pt reports still has bad cough 5-7 times a day, with phlegm, watery eyes, pt unsure if cough was due to lisinopril. Pt has been taking zyrtec daily and using her ventolin inhaler 2-3 x daily bringing some temporary relief.    Medication change at last visit: see above    Today's Vitals:   Vitals:    08/10/20 1058 08/10/20 1106   BP: 162/79 174/89   Patient Site: Right Arm Right Arm   Patient Position: Sitting Sitting   Cuff Size: Adult Regular Adult Regular   Pulse: 68 68       Home blood pressure readings brought in today:   None    Lowest blood pressure today is <180/<110 and they are experiencing signs or symptoms of new onset: fatigue- stated thinks its due to the coughing.  Matty Talbot    Current Outpatient Medications   Medication Sig Dispense Refill     albuterol (PROAIR HFA;PROVENTIL HFA;VENTOLIN HFA) 90 mcg/actuation inhaler Inhale 2 puffs every 6 (six) hours as needed for wheezing.       cetirizine (ZYRTEC) 10 MG tablet Take 10 mg by mouth daily.       losartan (COZAAR) 100 MG tablet Take 1 tablet (100 mg total) by mouth daily. 90 tablet 3     nitroglycerin (NITROSTAT) 0.4 MG SL tablet Place 0.4 mg under the tongue every 5 (five) minutes as needed for chest pain.       simvastatin (ZOCOR) 40 MG tablet Take 1 tablet (40 mg total) by mouth at bedtime. 90 tablet 1     No current facility-administered medications for this visit.

## 2021-06-11 NOTE — TELEPHONE ENCOUNTER
Medication Question or Clarification  Who is calling: Patient   What medication are you calling about (include dose and sig)?: triamcinolone (KENALOG) 0.1 % ointment   Who prescribed the medication?: Dr Emmanuel Lomas  What is your question/concern?: Patient was in this morning and asked for the above medication, but when she returned home she realized it was not the correct drug. She is asking for this to be switched to hydrocortisone 2.5% cream. She is also asking for the lisinopril to be removed from her medication list as she was started on losartan instead.  [facility]ed [id number]  Okay to leave a detailed message?: No

## 2021-06-11 NOTE — PATIENT INSTRUCTIONS - HE
Start prednisone today. Remainder of doses first thing in the morning with food.  Cool compresses to swollen areas.  Continue zyrtec.  May use steroid cream on bee sting.    Recheck at ER for throat swelling/shortness of breath.

## 2021-06-11 NOTE — TELEPHONE ENCOUNTER
"Pt states \"Got stung by a bee on top of scalp.\"  Occurred 48 hours ago.  Then, 24 hours ago \"forehead started swelling.\"  \"Then last evening \"swelling also included L eye orbit.\"    \"Today both eye orbits are swollen.\"  \"Took Zyrtec daily each day.\"    No systemic symptoms whatsoever.  Pt feels generally well.    No redness whatsoever.  No tenderness.  No suspicion therefore of cellulitis at this time.    However due to gradual spreading of swelling, pt agrees to same-day clinical eval.  Transferred to a  for an appt now.  No open appt slots found at pt's choice of clinics ([facility], [facility] or [facility]).  Pt therefore agrees to go to [facility] now.  Pt confirms address with .  Agrees to go now.    Lisa Adair RN  [facility] Triage     Reason for Disposition    Swelling is huge (e.g., > 4 inches or 10 cm, spreads beyond wrist or ankle)    Additional Information    Negative: Passed out (i.e., fainted, collapsed and was not responding)    Negative: Wheezing or difficulty breathing    Negative: Hoarseness, cough, or tightness in the throat or chest    Negative: Swollen tongue or difficulty swallowing    Negative: Life-threatening reaction in past to sting (anaphylaxis) and < 2 hours since sting    Negative: Sounds like a life-threatening emergency to the triager    Negative: Not a bee, wasp, hornet, or yellow jacket sting    Negative: Widespread hives, itching, or facial swelling and started within 2 hours of sting    Negative: Vomiting or abdominal cramps and started within 2 hours of sting    Negative: Gave epinephrine shot and no symptoms now    Negative: Patient sounds very sick or weak to the triager    Negative: Sting inside the mouth    Negative: Sting on eyeball (e.g., cornea)    Negative: More than 50 stings    Negative: Fever and area is red    Negative: Fever and area is very tender to touch    Negative: Red streak or red line and length > 2 inches (5 cm)    Negative: Red or very tender " (to touch) area, and started over 24 hours after the sting    Negative: Red or very tender (to touch) area, getting larger over 48 hours after the sting    Protocols used: BEE STING-A-OH    _____________________    No suspicion of covid symptoms at this time.  Nevertheless conveyed precautionary measures per current covid19 protocols:  COVID 19 Nurse Triage Plan/Patient Instructions    Please be aware that novel coronavirus (COVID-19) may be circulating in the community. If you develop symptoms such as fever, cough, or SOB or if you have concerns about the presence of another infection including coronavirus (COVID-19), please contact your health care provider or visit [email or web address].     Disposition/Instructions    Additional COVID19 information to add for patients.   How can I protect others?  If you have symptoms (fever, cough, body aches or trouble breathing): Stay home and away from others (self-isolate) until:    At least 10 days have passed since your symptoms started. And     You ve had no fever--and no medicine that reduces fever--for 1 full day (24 hours). And      Your other symptoms have resolved (gotten better).     If you don t have symptoms, but a test showed that you have COVID-19 (you tested positive):    Stay home and away from others (self-isolate) until at least 10 days have passed since the date of your first positive COVID-19 test.    During this time:    Stay in your own room, even for meals. Use your own bathroom if you can.     Stay away from others in your home. No hugging, kissing or shaking hands. No visitors.    Don t go to work, school or anywhere else.     Clean  high touch  surfaces often (doorknobs, counters, handles, etc.). Use a household cleaning spray or wipes. You ll find a full list on the EPA website:  www.epa.gov/pesticide-registration/list-n-disinfectants-use-against-sars-cov-2.    Cover your mouth and nose with a mask, tissue or washcloth to avoid spreading germs.    Wash  your hands and face often. Use soap and water.    Caregivers in these groups are at risk for severe illness due to COVID-19:  o People 65 years and older  o People who live in a nursing home or long-term care facility  o People with chronic disease (lung, heart, cancer, diabetes, kidney, liver, immunologic)  o People who have a weakened immune system, including those who:  - Are in cancer treatment  - Take medicine that weakens the immune system, such as corticosteroids  - Had a bone marrow or organ transplant  - Have an immune deficiency  - Have poorly controlled HIV or AIDS  - Are obese (body mass index of 40 or higher)  - Smoke regularly    Caregivers should wear gloves while washing dishes, handling laundry and cleaning bedrooms and bathrooms.    Use caution when washing and drying laundry: Don t shake dirty laundry, and use the warmest water setting that you can.    For more tips, go to www.cdc.gov/coronavirus/2019-ncov/downloads/10Things.pdf.    How can I take care of myself?  1. Get lots of rest. Drink extra fluids (unless a doctor has told you not to).     2. Take Tylenol (acetaminophen) for fever or pain. If you have liver or kidney problems, ask your family doctor if it s okay to take Tylenol.     Adults can take either:     650 mg (two 325 mg pills) every 4 to 6 hours, or     1,000 mg (two 500 mg pills) every 8 hours as needed.     Note: Don t take more than 3,000 mg in one day.   Acetaminophen is found in many medicines (both prescribed and over-the-counter medicines). Read all labels to be sure you don t take too much.     For children, check the Tylenol bottle for the right dose. The dose is based on the child s age or weight.    3. If you have other health problems (like cancer, heart failure, an organ transplant or severe kidney disease): Call your specialty clinic if you don t feel better in the next 2 days.    4. Know when to call 911: Emergency warning signs include:    Trouble breathing or  shortness of breath    Pain or pressure in the chest that doesn t go away    Feeling confused like you haven t felt before, or not being able to wake up    Bluish-colored lips or face    What are the symptoms of COVID-19?     The most common symptoms are cough, fever and trouble breathing.     Less common symptoms include body aches, chills, diarrhea (loose, watery poops), fatigue (feeling very tired), headache, runny nose, sore throat and loss of smell.    COVID-19 can cause severe coughing (bronchitis) and lung infection (pneumonia).    How does it spread?     The virus may spread when a person coughs or sneezes into the air. The virus can travel about 6 feet this way, and it can live on surfaces.      Common  (household disinfectants) will kill the virus.    Who is at risk?  Anyone can catch COVID-19 if they re around someone who has the virus.    How can others protect themselves?     Stay away from people who have COVID-19 (or symptoms of COVID-19).    Wash hands often with soap and water. Or, use hand  with at least 60% alcohol.    Avoid touching the eyes, nose or mouth.     Wear a face mask when you go out in public, when sick or when caring for a sick person.    Where can I get more information?    [facility]: About COVID-19: [email or web address]    CDC: What to Do If You re Sick: www.cdc.gov/coronavirus/2019-ncov/about/steps-when-sick.html    CDC: Ending Home Isolation: www.cdc.gov/coronavirus/2019-ncov/hcp/disposition-in-home-patients.html     CDC: Caring for Someone: www.cdc.gov/coronavirus/2019-ncov/if-you-are-sick/care-for-someone.html    [facility]: Interim Guidance for Hospital Discharge to Home: www.health.[facility].mn.us/diseases/coronavirus/hcp/hospdischarge.pdf    [facility] clinical trials (COVID-19 research studies): clinicalaffairs.[facility]-clinical-trials     Below are the COVID-19 hotlines at the [place] Department of Health ([facility]). Interpreters are  available.   o For health questions: Call 889-103-5989 or 1-125.794.3305 (7 a.m. to 7 p.m.)  o For questions about schools and childcare: Call 845-506-2444 or 1-630.689.6644 (7 a.m. to 7 p.m.)            Thank you for taking steps to prevent the spread of this virus.  o Limit your contact with others.  o Wear a simple mask to cover your cough.  o Wash your hands well and often.    Resources    [facility]: About COVID-19: [email or web address]    CDC: What to Do If You're Sick: www.cdc.gov/coronavirus/2019-ncov/about/steps-when-sick.html    CDC: Ending Home Isolation: www.cdc.gov/coronavirus/2019-ncov/hcp/disposition-in-home-patients.html     CDC: Caring for Someone: www.cdc.gov/coronavirus/2019-ncov/if-you-are-sick/care-for-someone.html     [facility]: Interim Guidance for Hospital Discharge to Home: www.[facility].mn.us/diseases/coronavirus/hcp/hospdischarge.pdf    [facility] clinical trials (COVID-19 research studies): clinicalaffairs.[facility]/n-clinical-trials     Below are the COVID-19 hotlines at the [place] Department of Health ([facility]). Interpreters are available.   o For health questions: Call 691-590-8741 or 1-348.481.7324 (7 a.m. to 7 p.m.)  o For questions about schools and childcare: Call 106-910-1129 or 1-230.921.5751 (7 a.m. to 7 p.m.)

## 2021-06-11 NOTE — PROGRESS NOTES
Chief Complaint   Patient presents with     Insect Bite     stung on saturday on top of head, swelling around both eye (started with one eye yesterday), swelling on both cheecks, no SOB or chest pain         HPI:   Ashleigh Chirinos is a 60 y.o. female stung by bee two days ago on top of head.  Lots of swelling on top of head, now going down around the eyes.  On zyrtec daily.  A little itching.  Used some ice.  No shortness of breath.  No swelling in back of throat.    Also having trouble with eczema and requests refill of her steroid cream      ROS:  A 10 point comprehensive review of systems was negative except as noted.     Medications:  Current Outpatient Medications on File Prior to Visit   Medication Sig Dispense Refill     albuterol (PROAIR HFA;PROVENTIL HFA;VENTOLIN HFA) 90 mcg/actuation inhaler Inhale 2 puffs every 6 (six) hours as needed for wheezing.       cetirizine (ZYRTEC) 10 MG tablet Take 10 mg by mouth daily.       lisinopriL (PRINIVIL,ZESTRIL) 10 MG tablet        losartan (COZAAR) 100 MG tablet Take 1 tablet (100 mg total) by mouth daily. 90 tablet 3     simvastatin (ZOCOR) 40 MG tablet Take 1 tablet (40 mg total) by mouth at bedtime. 90 tablet 1     nitroglycerin (NITROSTAT) 0.4 MG SL tablet Place 0.4 mg under the tongue every 5 (five) minutes as needed for chest pain.       No current facility-administered medications on file prior to visit.          Social History:  Social History     Tobacco Use     Smoking status: Former Smoker     Packs/day: 1.00     Years: 15.00     Pack years: 15.00     Types: Cigarettes     Smokeless tobacco: Never Used   Substance Use Topics     Alcohol use: Yes     Alcohol/week: 5.0 standard drinks     Types: 5 Cans of beer per week     Frequency: 2-3 times a week     Drinks per session: 1 or 2     Binge frequency: Never         Physical Exam:   Vitals:    09/14/20 1043 09/14/20 1046   BP: 157/76 139/76   Pulse: 65    Resp: 12    Temp: 97.8  F (36.6  C)    TempSrc: Oral     SpO2: 98%        GENERAL:   Alert. Oriented.  EYES: Clear  HENT:  Ears: R TM pearly gray. Normal landmarks. L TM pearly gray.  Normal landmarks  Nose: Clear.  Sinuses: Nontender.  Oropharynx:  No erythema. No exudate.  Face:  Swelling noticeable around eyes.  Scalp: small scab on top of head  NECK: Supple. No adenopathy.  LUNGS: Clear to ascultation.  No crackles.  No wheezing  HEART: RRR  SKIN:  Eczematous changes in ear    Assessment/Plan:    1. Bee sting reaction, accidental or unintentional, initial encounter  Localized reaction with swelling.  5 days of prednisone as prescribed.  Recheck if problems  - predniSONE (DELTASONE) 20 MG tablet; Take two tablets by mouth daily until gone..  Dispense: 10 tablet; Refill: 0  - triamcinolone (KENALOG) 0.1 % ointment; Apply topically 2 (two) times a day.  Dispense: 30 g; Refill: 0    2. Other eczema  Refilled triamcinolone.           Emmanuel Lomas MD      9/14/2020    The following portions of the patient's history were reviewed and updated as appropriate: allergies, current medications, past family history, past medical history, past social history, past surgical history and problem list.

## 2021-06-12 NOTE — TELEPHONE ENCOUNTER
Reason contacted:  Appt  Information relayed:  As per PCP note below.  Additional questions:  No  Further follow-up needed:  No, pt does not want to make an appt w/PCP at this time and will pursue an appt w/[facility].  Okay to leave a detailed message:  No

## 2021-06-12 NOTE — TELEPHONE ENCOUNTER
Please let patient know I would like to help her with her back pain, but in order to determine who or what is needed to help/resolve her back pain, I will need to see her in office. Please schedule an appointment. Given that it is back pain, I will also need a physical exam so video not an option.

## 2021-06-12 NOTE — TELEPHONE ENCOUNTER
Who is calling:  Patient  Reason for Call:  Patient stated she wants to see a back doctor for her back pain. Patient stated she does not know which type of specialist she wants to see. Patient stated she just wants to see someone who can fix her. Patient declined an appointment with Porter Morales DO and stated Porter Morales DO can't do anything for her. Patient was not very forthcoming with her responses.  Date of last appointment with primary care: 7/27/20  Okay to leave a detailed message: No

## 2021-06-13 NOTE — TELEPHONE ENCOUNTER
Refill Approved    Rx renewed per Medication Renewal Policy. Medication was last renewed on 5/19/20.    Sarai Nelson, [facility] Triage/Med Refill 12/11/2020     Requested Prescriptions   Pending Prescriptions Disp Refills     simvastatin (ZOCOR) 40 MG tablet [Pharmacy Med Name: SIMVASTATIN 40MG TABLETS] 90 tablet 1     Sig: TAKE 1 TABLET(40 MG) BY MOUTH AT BEDTIME       Statins Refill Protocol (Hmg CoA Reductase Inhibitors) Passed - 12/10/2020  4:10 AM        Passed - PCP or prescribing provider visit in past 12 months      Last office visit with prescriber/PCP: 1/29/2020 Porter Morales DO OR same dept: 1/29/2020 Porter Morales DO OR same specialty: 1/29/2020 Porter Morales DO  Last physical: 7/27/2020 Last MTM visit: Visit date not found   Next visit within 3 mo: Visit date not found  Next physical within 3 mo: Visit date not found  Prescriber OR PCP: Porter Morales DO  Last diagnosis associated with med order: 1. Hyperlipidemia, unspecified hyperlipidemia type  - simvastatin (ZOCOR) 40 MG tablet [Pharmacy Med Name: SIMVASTATIN 40MG TABLETS]; TAKE 1 TABLET(40 MG) BY MOUTH AT BEDTIME  Dispense: 90 tablet; Refill: 1    2. Essential hypertension  - lisinopriL (PRINIVIL,ZESTRIL) 10 MG tablet [Pharmacy Med Name: LISINOPRIL 10MG TABLETS]; TAKE 1 TABLET(10 MG) BY MOUTH DAILY  Dispense: 90 tablet; Refill: 1    If protocol passes may refill for 12 months if within 3 months of last provider visit (or a total of 15 months).                lisinopriL (PRINIVIL,ZESTRIL) 10 MG tablet [Pharmacy Med Name: LISINOPRIL 10MG TABLETS] 90 tablet 1     Sig: TAKE 1 TABLET(10 MG) BY MOUTH DAILY       Ace Inhibitors Refill Protocol Passed - 12/10/2020  4:10 AM        Passed - PCP or prescribing provider visit in past 12 months       Last office visit with prescriber/PCP: 1/29/2020 Porter Morales DO OR same dept: 1/29/2020 Porter Morales DO OR same specialty:  1/29/2020 Porter Morales DO  Last physical: 7/27/2020 Last MTM visit: Visit date not found   Next visit within 3 mo: Visit date not found  Next physical within 3 mo: Visit date not found  Prescriber OR PCP: Porter Morales DO  Last diagnosis associated with med order: 1. Hyperlipidemia, unspecified hyperlipidemia type  - simvastatin (ZOCOR) 40 MG tablet [Pharmacy Med Name: SIMVASTATIN 40MG TABLETS]; TAKE 1 TABLET(40 MG) BY MOUTH AT BEDTIME  Dispense: 90 tablet; Refill: 1    2. Essential hypertension  - lisinopriL (PRINIVIL,ZESTRIL) 10 MG tablet [Pharmacy Med Name: LISINOPRIL 10MG TABLETS]; TAKE 1 TABLET(10 MG) BY MOUTH DAILY  Dispense: 90 tablet; Refill: 1    If protocol passes may refill for 12 months if within 3 months of last provider visit (or a total of 15 months).             Passed - Serum Potassium in past 12 months     Lab Results   Component Value Date    Potassium 4.8 07/27/2020             Passed - Blood pressure filed in past 12 months     BP Readings from Last 1 Encounters:   09/14/20 139/76             Passed - Serum Creatinine in past 12 months     Creatinine   Date Value Ref Range Status   07/27/2020 0.75 0.60 - 1.10 mg/dL Final                  lisinopriL (PRINIVIL,ZESTRIL) 10 MG tablet [[id number]]  Patient-reported historical medication  Ordering date: 09/14/20 1041 Authorized by: PROVIDER, HISTORICAL   Frequency:  09/04/20 - 09/14/20  Discontinued by: Porter Morales DO 09/14/20 1300

## 2021-06-15 ENCOUNTER — COMMUNICATION - HEALTHEAST (OUTPATIENT)
Dept: FAMILY MEDICINE | Facility: CLINIC | Age: 61
End: 2021-06-15

## 2021-06-15 DIAGNOSIS — E78.5 HYPERLIPIDEMIA, UNSPECIFIED HYPERLIPIDEMIA TYPE: ICD-10-CM

## 2021-06-15 RX ORDER — SIMVASTATIN 40 MG
TABLET ORAL
Qty: 90 TABLET | Refills: 2 | Status: SHIPPED | OUTPATIENT
Start: 2021-06-15 | End: 2022-03-07

## 2021-06-15 NOTE — PROGRESS NOTES
"  Assessment & Plan   Problem List Items Addressed This Visit     Essential hypertension     Not fully controlled below 140/90.  Discussed options of increasing exercise to more routine versus dose adjustment to include adding in amlodipine at 2.5 mg daily.  Discussed strategies for keeping exercise consistent as well as getting in a minimum of 15 to 30 minutes daily of mild to moderate activity.  Patient would like avoid adding extra medications thus will move to routine exercise.  Stressed the fact that this is routine exercise and this is now a prescription for control of her high blood pressure.  Follow-up in 4 months.  At that time if not consistent with exercise or not seeing the response in blood pressure to below goal of 140/90 then will look at starting amlodipine 2.5 mg daily.         Hyperlipidemia     Tolerating Zocor at current dosing.  Will continue at 40 mg daily.  Will recheck levels at annual physical and hypertension follow-up in 4 months.           Other Visit Diagnoses     Screening for colon cancer    -  Primary    Relevant Orders    Ambulatory referral for Colonoscopy         BMI:   Estimated body mass index is 28.76 kg/m  as calculated from the following:    Height as of this encounter: 5' 2.25\" (1.581 m).    Weight as of this encounter: 158 lb 8 oz (71.9 kg).   The following high BMI interventions were performed this visit: encouragement to exercise      Return in about 4 months (around 6/15/2021) for Annual physical.  Subjective   Asheligh Chirinos is 60 y.o. and presents to clinic today for the following health issues   60 y.o. female presents for follow-up on hypertension hyperlipidemia.  Has been consistent her dosing and not missing any doses.  Admits that her exercise has not been routine and with that she has had slight weight gain as well.  She does have a stationary bike at home.  She did hurt her back a few months ago which is why she was not able to continue exercise and then has not " "restarted exercise.  Her home blood pressures have been reading slightly higher on her home cuff as well and are consistent with the blood pressures found here in clinic today.     Review of Systems   Constitutional: Negative for chills, fatigue and fever.   Respiratory: Negative for chest tightness, shortness of breath and wheezing.    Cardiovascular: Negative for chest pain, palpitations and leg swelling.   Gastrointestinal: Negative for blood in stool, constipation, diarrhea, nausea and vomiting.   Genitourinary: Negative for difficulty urinating and dysuria.   Musculoskeletal: Positive for back pain.   Neurological: Negative for weakness and numbness.         Objective    /86   Pulse 64   Temp 98.3  F (36.8  C) (Oral)   Resp 12   Ht 5' 2.25\" (1.581 m)   Wt 158 lb 8 oz (71.9 kg)   LMP  (LMP Unknown)   Breastfeeding No   BMI 28.76 kg/m    Body mass index is 28.76 kg/m .  Physical Exam   Constitutional: She is oriented to person, place, and time. She appears well-developed and well-nourished.   HENT:   Head: Normocephalic and atraumatic.   Eyes: Conjunctivae and EOM are normal.   Cardiovascular: Normal rate, regular rhythm and normal heart sounds.   No murmur heard.  Pulmonary/Chest: Effort normal and breath sounds normal.   Musculoskeletal:         General: No edema.   Neurological: She is alert and oriented to person, place, and time.   Skin: She is not diaphoretic.   Psychiatric: She has a normal mood and affect. Her behavior is normal.   Nursing note and vitals reviewed.    "

## 2021-06-16 PROBLEM — L42 PITYRIASIS ROSEA: Status: ACTIVE | Noted: 2020-01-29

## 2021-06-16 PROBLEM — I10 ESSENTIAL HYPERTENSION: Status: ACTIVE | Noted: 2020-01-29

## 2021-06-16 PROBLEM — J30.1 SEASONAL ALLERGIC RHINITIS DUE TO POLLEN: Status: ACTIVE | Noted: 2020-03-27

## 2021-06-16 PROBLEM — H10.45 CHRONIC ALLERGIC CONJUNCTIVITIS: Status: ACTIVE | Noted: 2020-01-29

## 2021-06-16 PROBLEM — E78.5 HYPERLIPIDEMIA: Status: ACTIVE | Noted: 2020-01-29

## 2021-06-16 PROBLEM — L30.8 OTHER ECZEMA: Status: ACTIVE | Noted: 2020-09-14

## 2021-06-16 NOTE — ANESTHESIA PREPROCEDURE EVALUATION
Anesthesia Evaluation      Patient summary reviewed   No history of anesthetic complications     Airway   Mallampati: II  Neck ROM: full   Pulmonary - negative ROS and normal exam                          Cardiovascular - normal exam  Exercise tolerance: > or = 4 METS  (+) hypertension, , hypercholesterolemia,     ECG reviewed        Neuro/Psych - negative ROS     Endo/Other - negative ROS      GI/Hepatic/Renal - negative ROS           Dental - normal exam                        Anesthesia Plan  Planned anesthetic: MAC    ASA 2     Anesthetic plan and risks discussed with: patient    Post-op plan: routine recovery

## 2021-06-16 NOTE — ANESTHESIA CARE TRANSFER NOTE
Last vitals:   Vitals:    03/23/21 0924   BP: 92/54   Pulse: (!) 58   Resp: 16   Temp: 36.1  C (97  F)   SpO2: 99%     Patient's level of consciousness is drowsy  Spontaneous respirations: yes  Maintains airway independently: yes  Dentition unchanged: yes  Oropharynx: oropharynx clear of all foreign objects    QCDR Measures:  ASA# 20 - Surgical Safety Checklist: WHO surgical safety checklist completed prior to induction    PQRS# 430 - Adult PONV Prevention: 4558F - Pt received => 2 anti-emetic agents (different classes) preop & intraop  ASA# 8 - Peds PONV Prevention: NA - Not pediatric patient, not GA or 2 or more risk factors NOT present  PQRS# 424 - Leila-op Temp Management: 4559F - At least one body temp DOCUMENTED => 35.5C or 95.9F within required timeframe  PQRS# 426 - PACU Transfer Protocol: - Transfer of care checklist used  ASA# 14 - Acute Post-op Pain: ASA14B - Patient did NOT experience pain >= 7 out of 10

## 2021-06-16 NOTE — ANESTHESIA POSTPROCEDURE EVALUATION
Patient: Ashleigh Chirinos  Procedure(s):  COLONOSCOPY  Anesthesia type: MAC    Patient location: Phase II Recovery  Last vitals:   Vitals Value Taken Time   /56 03/23/21 0931   Temp 36.1  C (97  F) 03/23/21 0924   Pulse 60 03/23/21 0935   Resp 16 03/23/21 0930   SpO2 95 % 03/23/21 0935   Vitals shown include unvalidated device data.  Post vital signs: stable  Level of consciousness: awake and responds to simple questions  Post-anesthesia pain: pain controlled  Post-anesthesia nausea and vomiting: no  Pulmonary: unassisted, return to baseline  Cardiovascular: stable and blood pressure at baseline  Hydration: adequate  Anesthetic events: no    QCDR Measures:  ASA# 11 - Leila-op Cardiac Arrest: ASA11B - Patient did NOT experience unanticipated cardiac arrest  ASA# 12 - Leila-op Mortality Rate: ASA12B - Patient did NOT die  ASA# 13 - PACU Re-Intubation Rate: NA - No ETT / LMA used for case  ASA# 10 - Composite Anes Safety: ASA10A - No serious adverse event    Additional Notes:

## 2021-06-21 NOTE — LETTER
"Social Work Services Progress Note    Hospital Day: 8    Collaborated with:   Pt, 10A IDT, [facility]    Data:  Discharge plan    Intervention:   Dr Brice requested SW review discharge plan with pt and refer to appropriate CD resources. SW met w/pt , he was receptive. He states he had a misunderstanding with his mother, thinking she was providing support/arranging CD treatment for him.     He is interested in pursuing outpt cd treatment. He states not being fully agreeable to having outpt CD treatment in past due to his success with abstaining from alcohol for 30-90 days, but then relapsing by bingeing.     He has also had negative encounters with AA groups where he did not like the 12 step approach. Pt and SW discussed treatment options, reframed AA meetings as a \"tool\" for sobriety vs. The treatment. SW provided education about structure of inpt cd treatments vs. Outpt. He is interested in dual outpt cd treatment. SW discussed [facility], [facility], and [facility]. He requested assistance with referral/appointment for [facility] at [facility]. SW provided additional programs/contact information in's discharge instructions. Dr Brice updated.    Assessment:  pt agreeable to treatment.     Plan:    Anticipated Disposition:  Home with services    Barriers to d/c plan:  None noted    Follow Up:  Proceed to discharge.    " Letter by Roxanne Ribeiro RN at      Author: Roxanne Ribeiro RN Service: -- Author Type: --    Filed:  Encounter Date: 3/26/2021 Status: (Other)       3/26/2021    Ashleigh Chirinos  [address] [place] [place] [facility] [address]    Re: Recent Colonoscopy    Dear Ashleihg Chirinos.    We are writing with results from your recent colonoscopy which showed:     Good news from your colonoscopy! The polyp removed from the rectum turned out to be some lymphoid tissue.  This does not represent a risk for development of colon cancer and with that I think we can safely postpone or plan to do your next colonoscopy in 10 years or the year 2031     If any new concerns arise in the meantime, please contact your primary care provider for evaluation so your provider can help you decide if you need a colonscopy sooner. Some things to watch for include blood in your stool, stomach pain or cramping that does not resolve, or unexplained weight loss.    We sincerely appreciate the opportunity to provide your care. If you have any questions please feel free to contact us at [phone].    Sincerely,     Travis Wang MD

## 2021-06-24 ENCOUNTER — COMMUNICATION - HEALTHEAST (OUTPATIENT)
Dept: FAMILY MEDICINE | Facility: CLINIC | Age: 61
End: 2021-06-24

## 2021-06-24 DIAGNOSIS — I10 ESSENTIAL HYPERTENSION: ICD-10-CM

## 2021-06-25 RX ORDER — LOSARTAN POTASSIUM 100 MG/1
100 TABLET ORAL DAILY
Qty: 90 TABLET | Refills: 3 | Status: SHIPPED | OUTPATIENT
Start: 2021-06-25 | End: 2022-06-06

## 2021-06-27 ENCOUNTER — HEALTH MAINTENANCE LETTER (OUTPATIENT)
Age: 61
End: 2021-06-27

## 2021-06-29 NOTE — PROGRESS NOTES
Progress Notes by Porter Morales DO at 7/27/2020  9:40 AM     Author: Porter Morales DO Service: -- Author Type: Physician    Filed: 7/27/2020  7:17 PM Encounter Date: 7/27/2020 Status: Signed    : Porter Morales DO (Physician)       FEMALE PREVENTATIVE EXAM    Assessment and Plan:       Problem List Items Addressed This Visit     Essential hypertension     Not fully controlled and having cough from lisinopril.  As such we will move over to losartan and increase the equivalent dose to 100 mg daily.  Follow-up in 2 weeks for nurse visit for recheck of blood pressure with goal of less than 140/90 in office.  Side effects and precautions reviewed.         Relevant Medications    losartan (COZAAR) 100 MG tablet    Other Relevant Orders    Basic Metabolic Panel (Completed)    Hyperlipidemia     Tolerating simvastatin 40 mg daily well.  We will continue at current dosing.  Will check lipid profile as per guidance as well as AST.         Relevant Orders    Lipid Todd- FASTING (Completed)    AST (SGOT) (Completed)      Other Visit Diagnoses     Screening for colon cancer    -  Primary    Relevant Orders    Ambulatory referral for Colonoscopy    Encounter for screening for diabetes mellitus        Relevant Orders    Basic Metabolic Panel (Completed)    Encounter for screening for lipoid disorders        Relevant Orders    Lipid Todd- FASTING (Completed)    Screening for HIV without presence of risk factors        Per USPSTF recommendations    Relevant Orders    HIV Antigen/Antibody Screening Cascade (Completed)    Encounter for hepatitis C screening test for low risk patient        Per USPSTF recommendations    Relevant Orders    Hepatitis C Antibody (Anti-HCV) (pts born [birth year])            Next follow up:  Return in about 6 months (around 1/27/2021) for Hypertension, Hyperlipidemia.    Immunization Review  Adult Imm Review: No immunizations due today    I discussed the  following with the patient:   Adult Healthy Living: Importance of regular exercise  Healthy nutrition  Getting adequate sleep  Stress management    I have had an Advance Directives discussion with the patient.    Subjective:   Chief Complaint: Ashleigh Chirinos is an 60 y.o. female here for a preventative health visit.     HPI:  Denies any chest pain, shortness of breath, dyspnea exertion, palpitations, nausea or vomiting.  Denies any changes in vision or hearing, or urinary or bowel habits.  Has been having a nonproductive cough.  Feels like mucus production.  He can come in anytime.  Normally notices at night.  Can also be during the day.  Coughing spell will last for 30 seconds up to 2 minutes.  Drinking water or taking cough drops does not seem to help.  Did not get relief from albuterol nor from Flovent.  Nor did she get relief from Tessalon Perles.  She has had some improvement with treatment of her allergies using over-the-counter cetirizine.    Healthy Habits  Are you taking a daily aspirin? No  Do you typically exercising at least 40 min, 3-4 times per week?  Yes  Do you usually eat at least 4 servings of fruit and vegetables a day, include whole grains and fiber and avoid regularly eating high fat foods? Yes  Have you had an eye exam in the past two years? Yes  Do you see a dentist twice per year? Yes  Do you have any concerns regarding sleep? No    Safety Screen  If you own firearms, are they secured in a locked gun cabinet or with trigger locks? Yes  Do you feel you are safe where you are living?: Yes (7/27/2020  9:52 AM)  Do you feel you are safe in your relationship(s)?: Yes (7/27/2020  9:52 AM)      Review of Systems:  Please see above.  The rest of the review of systems are negative for all systems.       Cancer Screening       Status Date      MAMMOGRAM Next Due 7/8/2021      Done 7/8/2020 MAMMO SCREENING BILATERAL     Patient has more history with this topic...    PAP SMEAR This plan is no longer  "active.           History     Reviewed By Date/Time Sections Reviewed    Porter Morales, DO 7/27/2020 10:06 AM Medical, Surgical, Tobacco, Family, Socioeconomic    Manjinder Alanis Jr., [facility] 7/27/2020  9:55 AM Family    Manjinder Alanis Jr., [facility] 7/27/2020  9:54 AM Medical, Surgical, Family    Manjinder Alanis Jr., [facility] 7/27/2020  9:52 AM Tobacco, Alcohol, Drug Use, Sexual Activity, Socioeconomic            Objective:   Vital Signs:   Visit Vitals  /82 (Patient Site: Left Arm, Patient Position: Sitting, Cuff Size: Adult Large)   Pulse 68   Temp 97.3  F (36.3  C) (Oral)   Resp 12   Ht 5' 2.25\" (1.581 m)   Wt 152 lb 9.6 oz (69.2 kg)   BMI 27.69 kg/m           PHYSICAL EXAM  Physical Exam  Vitals signs and nursing note reviewed.   Constitutional:       General: She is not in acute distress.     Appearance: Normal appearance. She is well-developed.   HENT:      Head: Normocephalic and atraumatic.      Right Ear: Tympanic membrane, ear canal and external ear normal.      Left Ear: Tympanic membrane, ear canal and external ear normal.      Nose: Nose normal.      Mouth/Throat:      Mouth: Mucous membranes are moist.      Pharynx: Oropharynx is clear. No oropharyngeal exudate or posterior oropharyngeal erythema.   Eyes:      Extraocular Movements: Extraocular movements intact.      Conjunctiva/sclera: Conjunctivae normal.      Pupils: Pupils are equal, round, and reactive to light.   Neck:      Musculoskeletal: Normal range of motion and neck supple.      Thyroid: No thyromegaly.      Vascular: No carotid bruit.   Cardiovascular:      Rate and Rhythm: Normal rate and regular rhythm.      Heart sounds: Normal heart sounds. No murmur. No friction rub. No gallop.    Pulmonary:      Effort: Pulmonary effort is normal.      Breath sounds: Normal breath sounds. No wheezing, rhonchi or rales.   Musculoskeletal:      Right lower leg: No edema.      Left lower leg: No edema.   Skin:     General: Skin is warm and dry. "      Capillary Refill: Capillary refill takes less than 2 seconds.   Neurological:      Mental Status: She is alert and oriented to person, place, and time.      Deep Tendon Reflexes: Reflexes are normal and symmetric.      Reflex Scores:       Bicep reflexes are 2+ on the right side and 2+ on the left side.       Patellar reflexes are 2+ on the right side and 2+ on the left side.       Achilles reflexes are 2+ on the right side and 2+ on the left side.  Psychiatric:         Mood and Affect: Mood normal.             Medication List          Accurate as of July 27, 2020  7:17 PM. If you have any questions, ask your nurse or doctor.            START taking these medications    losartan 100 MG tablet  Also known as:  COZAAR  INSTRUCTIONS:  Take 1 tablet (100 mg total) by mouth daily.  Started by:  Porter Morales, DO           CONTINUE taking these medications    nitroglycerin 0.4 MG SL tablet  Also known as:  NITROSTAT  INSTRUCTIONS:  Place 0.4 mg under the tongue every 5 (five) minutes as needed for chest pain.        simvastatin 40 MG tablet  Also known as:  ZOCOR  INSTRUCTIONS:  Take 1 tablet (40 mg total) by mouth at bedtime.           STOP taking these medications    albuterol 90 mcg/actuation inhaler  Also known as:  PROAIR HFA;PROVENTIL HFA;VENTOLIN HFA  Stopped by:  Porter Morales DO     benzonatate 100 MG capsule  Also known as:  TESSALON  Stopped by:  Porter Morales, DO     Flovent  mcg/actuation inhaler  Generic drug:  fluticasone propionate  Stopped by:  Porter Morales, DO     fluticasone propionate 50 mcg/actuation nasal spray  Also known as:  FLONASE  Stopped by:  Porter Morales DO     inhalational spacing device Spcr  Stopped by:  Porter Morales, DO     lisinopriL 10 MG tablet  Also known as:  PRINIVIL,ZESTRIL  Stopped by:  Porter Morales, DO           Where to Get Your Medications      These [id number] [place] [facility] [address] AT [facility] [address]  [address], [place] [address]    Phone:  [phone]     losartan 100 MG tablet         Additional Screenings Completed Today:

## 2021-07-03 NOTE — ADDENDUM NOTE
Addendum Note by Renaldo Zyaas, RN at 7/28/2020  9:09 AM     Author: Renaldo Zayas RN Service: -- Author Type: Registered Nurse    Filed: 7/29/2020 10:29 AM Encounter Date: 7/28/2020 Status: Signed    : Renaldo Zayas RN (Registered Nurse)    Addended by: RENALDO ZAYAS on: 7/29/2020 10:29 AM        Modules accepted: Orders

## 2021-07-03 NOTE — ADDENDUM NOTE
Addendum Note by Renaldo Zayas, RN at 7/28/2020  9:09 AM     Author: Renaldo Zayas RN Service: -- Author Type: Registered Nurse    Filed: 9/24/2020 12:51 PM Encounter Date: 7/28/2020 Status: Signed    : Renaldo Zayas RN (Registered Nurse)    Addended by: RENALDO ZAYAS on: 9/24/2020 12:51 PM        Modules accepted: Orders

## 2021-07-13 ENCOUNTER — RECORDS - HEALTHEAST (OUTPATIENT)
Dept: ADMINISTRATIVE | Facility: CLINIC | Age: 61
End: 2021-07-13

## 2021-07-21 ENCOUNTER — RECORDS - HEALTHEAST (OUTPATIENT)
Dept: ADMINISTRATIVE | Facility: CLINIC | Age: 61
End: 2021-07-21

## 2021-07-28 ENCOUNTER — OFFICE VISIT (OUTPATIENT)
Dept: FAMILY MEDICINE | Facility: CLINIC | Age: 61
End: 2021-07-28
Payer: COMMERCIAL

## 2021-07-28 VITALS
DIASTOLIC BLOOD PRESSURE: 88 MMHG | HEIGHT: 62 IN | SYSTOLIC BLOOD PRESSURE: 133 MMHG | WEIGHT: 153.8 LBS | HEART RATE: 64 BPM | TEMPERATURE: 98 F | BODY MASS INDEX: 28.3 KG/M2 | RESPIRATION RATE: 12 BRPM

## 2021-07-28 DIAGNOSIS — E78.5 HYPERLIPIDEMIA, UNSPECIFIED HYPERLIPIDEMIA TYPE: ICD-10-CM

## 2021-07-28 DIAGNOSIS — Z12.31 ENCOUNTER FOR SCREENING MAMMOGRAM FOR BREAST CANCER: ICD-10-CM

## 2021-07-28 DIAGNOSIS — L40.9 PSORIASIS: ICD-10-CM

## 2021-07-28 DIAGNOSIS — G89.29 CHRONIC LEFT-SIDED LOW BACK PAIN WITH LEFT-SIDED SCIATICA: ICD-10-CM

## 2021-07-28 DIAGNOSIS — Z13.1 DIABETES MELLITUS SCREENING: ICD-10-CM

## 2021-07-28 DIAGNOSIS — Z13.220 LIPID SCREENING: ICD-10-CM

## 2021-07-28 DIAGNOSIS — J30.1 SEASONAL ALLERGIC RHINITIS DUE TO POLLEN: ICD-10-CM

## 2021-07-28 DIAGNOSIS — L72.0 EPIDERMAL CYST: ICD-10-CM

## 2021-07-28 DIAGNOSIS — I10 ESSENTIAL HYPERTENSION: ICD-10-CM

## 2021-07-28 DIAGNOSIS — Z00.00 ROUTINE GENERAL MEDICAL EXAMINATION AT A HEALTH CARE FACILITY: Primary | ICD-10-CM

## 2021-07-28 DIAGNOSIS — M54.42 CHRONIC LEFT-SIDED LOW BACK PAIN WITH LEFT-SIDED SCIATICA: ICD-10-CM

## 2021-07-28 LAB
ALT SERPL W P-5'-P-CCNC: 44 U/L (ref 0–45)
ANION GAP SERPL CALCULATED.3IONS-SCNC: 13 MMOL/L (ref 5–18)
BUN SERPL-MCNC: 11 MG/DL (ref 8–22)
C REACTIVE PROTEIN LHE: 0.4 MG/DL (ref 0–0.8)
CALCIUM SERPL-MCNC: 10.1 MG/DL (ref 8.5–10.5)
CHLORIDE BLD-SCNC: 103 MMOL/L (ref 98–107)
CHOLEST SERPL-MCNC: 225 MG/DL
CO2 SERPL-SCNC: 26 MMOL/L (ref 22–31)
CREAT SERPL-MCNC: 0.78 MG/DL (ref 0.6–1.1)
ERYTHROCYTE [SEDIMENTATION RATE] IN BLOOD BY WESTERGREN METHOD: 8 MM/HR (ref 0–20)
FASTING STATUS PATIENT QL REPORTED: YES
GFR SERPL CREATININE-BSD FRML MDRD: 81 ML/MIN/1.73M2
GLUCOSE BLD-MCNC: 90 MG/DL (ref 70–125)
HDLC SERPL-MCNC: 65 MG/DL
LDLC SERPL CALC-MCNC: 123 MG/DL
POTASSIUM BLD-SCNC: 4.2 MMOL/L (ref 3.5–5)
SODIUM SERPL-SCNC: 142 MMOL/L (ref 136–145)
TRIGL SERPL-MCNC: 183 MG/DL

## 2021-07-28 PROCEDURE — 99213 OFFICE O/P EST LOW 20 MIN: CPT | Mod: 25 | Performed by: FAMILY MEDICINE

## 2021-07-28 PROCEDURE — 99396 PREV VISIT EST AGE 40-64: CPT | Performed by: FAMILY MEDICINE

## 2021-07-28 PROCEDURE — 85652 RBC SED RATE AUTOMATED: CPT | Performed by: FAMILY MEDICINE

## 2021-07-28 PROCEDURE — 36415 COLL VENOUS BLD VENIPUNCTURE: CPT | Performed by: FAMILY MEDICINE

## 2021-07-28 PROCEDURE — 86141 C-REACTIVE PROTEIN HS: CPT | Performed by: FAMILY MEDICINE

## 2021-07-28 PROCEDURE — 80048 BASIC METABOLIC PNL TOTAL CA: CPT | Performed by: FAMILY MEDICINE

## 2021-07-28 PROCEDURE — 86200 CCP ANTIBODY: CPT | Performed by: FAMILY MEDICINE

## 2021-07-28 PROCEDURE — 84460 ALANINE AMINO (ALT) (SGPT): CPT | Performed by: FAMILY MEDICINE

## 2021-07-28 PROCEDURE — 80061 LIPID PANEL: CPT | Performed by: FAMILY MEDICINE

## 2021-07-28 PROCEDURE — 86038 ANTINUCLEAR ANTIBODIES: CPT | Performed by: FAMILY MEDICINE

## 2021-07-28 RX ORDER — BETAMETHASONE VALERATE 1.2 MG/G
CREAM TOPICAL 2 TIMES DAILY
Qty: 45 G | Refills: 3 | Status: SHIPPED | OUTPATIENT
Start: 2021-07-28 | End: 2022-08-01

## 2021-07-28 ASSESSMENT — MIFFLIN-ST. JEOR: SCORE: 1209.85

## 2021-07-28 NOTE — ASSESSMENT & PLAN NOTE
Had mild improvement with physical therapy but still now causing daily pain as well as limitations to activities of daily living.  As such we will get MRI for further evaluation.  Pain upon results will determine next steps.

## 2021-07-28 NOTE — PROGRESS NOTES
SUBJECTIVE:   CC: Ashleigh Chirinos is an 61 year old woman who presents for preventive health visit.       Patient has been advised of split billing requirements and indicates understanding: Yes     Denies any chest pain, shortness of breath, dyspnea exertion, palpitations, nausea or vomiting.  Denies any changes in vision or hearing, or urinary or bowel habits.    Never skin changes around ankles and back of hands with a silver scale.  Worsening over the past few years.  She does keep lotion twice a day but does not seem to fully resolve.  Tends itch from time to time, silver scale is more of an annoyance.  Especially in the front of the ankles, and back of hands.  Left lower back pain has had radiation in the left leg.  She went through physical therapy which has helped considerably and that she is not having daily radiation into the left leg.  However still having daily lower left back pain, certain activities such as swimming, walking up or down hill, riding 4 wheelers, will cause increased pain over the next 2 to 3 days and actually stops from completing ADLs.  This includes when she cleans her house it flares and has 2 to 3 days in which she cannot do activities.  When it flares to that nature she does get radiation into the left leg.  Laying flat improves, sitting does not help but also does not worsen when it is flared.  No loss of bowel or bladder.  No saddle paresthesia.    Left upper arm with an area that does itch and occasionally bleeds.  It is normally a small bump which she scratches off.  Has not increased in size.  Been present for about 6 to 8 months.  Just when it is healing she tends to scratch it.    Healthy Habits:     Getting at least 3 servings of Calcium per day:  Yes    Bi-annual eye exam:  Yes    Dental care twice a year:  Yes    Sleep apnea or symptoms of sleep apnea:  Excessive snoring    Diet:  Regular (no restrictions)    Frequency of exercise:  2-3 days/week    Duration of exercise:   15-30 minutes    Taking medications regularly:  Yes    Barriers to taking medications:  None    Medication side effects:  None    PHQ-2 Total Score: 0    Additional concerns today:  Yes      Today's PHQ-2 Score:   PHQ-2 ( 1999 [employer]) 7/28/2021   Q1: Little interest or pleasure in doing things 0   Q2: Feeling down, depressed or hopeless 0   PHQ-2 Score 0   Q1: Little interest or pleasure in doing things Not at all   Q2: Feeling down, depressed or hopeless Not at all   PHQ-2 Score 0       Abuse: Current or Past (Physical, Sexual or Emotional) - No  Do you feel safe in your environment? Yes        Social History     Tobacco Use     Smoking status: Former Smoker     Packs/day: 1.00     Years: 15.00     Pack years: 15.00     Types: Cigarettes, Cigarettes     Smokeless tobacco: Never Used   Substance Use Topics     Alcohol use: Yes     Alcohol/week: 5.0 standard drinks     Types: 5 Cans of beer per week         Alcohol Use 7/28/2021   Prescreen: >3 drinks/day or >7 drinks/week? No       Reviewed orders with patient.  Reviewed health maintenance and updated orders accordingly - Yes  Lab work is in process    Breast Cancer Screening:  Any new diagnosis of family breast, ovarian, or bowel cancer? No    FHS-7: No flowsheet data found.    Mammogram Screening: Recommended mammography every 1-2 years with patient discussion and risk factor consideration  Pertinent mammograms are reviewed under the imaging tab.    History of abnormal Pap smear: Status post benign hysterectomy. Health Maintenance and Surgical History updated.     Reviewed and updated as needed this visit by clinical staff  Tobacco  Allergies  Meds  Problems  Med Hx  Surg Hx  Fam Hx  Soc Hx          Reviewed and updated as needed this visit by Provider  Tobacco  Allergies  Meds  Problems  Med Hx  Surg Hx  Fam Hx           Review of Systems   All other systems reviewed and are negative.       OBJECTIVE:   /88   Pulse 64   Temp 98  F (36.7  C)  "(Oral)   Resp 12   Ht 1.581 m (5' 2.25\")   Wt 69.8 kg (153 lb 12.8 oz)   LMP  (LMP Unknown)   Breastfeeding No   BMI 27.90 kg/m    Physical Exam  Vitals and nursing note reviewed.   Constitutional:       General: She is not in acute distress.     Appearance: Normal appearance.   HENT:      Head: Normocephalic and atraumatic.      Right Ear: Tympanic membrane, ear canal and external ear normal.      Left Ear: Tympanic membrane, ear canal and external ear normal.      Nose: Nose normal.      Mouth/Throat:      Mouth: Mucous membranes are moist.      Pharynx: Oropharynx is clear. No oropharyngeal exudate.   Eyes:      General: No scleral icterus.     Extraocular Movements: Extraocular movements intact.      Conjunctiva/sclera: Conjunctivae normal.   Neck:      Vascular: No carotid bruit.   Cardiovascular:      Rate and Rhythm: Normal rate and regular rhythm.      Pulses: Normal pulses.      Heart sounds: Normal heart sounds. No murmur heard.   No friction rub. No gallop.    Pulmonary:      Effort: Pulmonary effort is normal.      Breath sounds: Normal breath sounds. No wheezing, rhonchi or rales.   Musculoskeletal:         General: No swelling. Normal range of motion.      Cervical back: Normal range of motion.      Right lower leg: No edema.      Left lower leg: No edema.   Skin:     General: Skin is warm and dry.      Capillary Refill: Capillary refill takes less than 2 seconds.      Coloration: Skin is not jaundiced.      Comments: Front of bilateral ankles as well as the back lateral portions of bilateral hands with significant erythematous this plaque with mild silver scaling.    Upper left arm with a 2 mm epidermal cyst.   Neurological:      General: No focal deficit present.      Mental Status: She is alert and oriented to person, place, and time.      Cranial Nerves: No cranial nerve deficit.      Gait: Gait is intact.      Deep Tendon Reflexes: Reflexes normal.      Reflex Scores:       Bicep reflexes " are 2+ on the right side and 2+ on the left side.       Patellar reflexes are 2+ on the right side and 2+ on the left side.     Comments: Positive seated straight leg raise on the left.  Negative on right.   Psychiatric:         Mood and Affect: Mood normal.         Thought Content: Thought content normal.           Diagnostic Test Results:  No results found for any visits on 07/28/21.    ASSESSMENT/PLAN:     Problem List Items Addressed This Visit        Nervous and Auditory    Chronic left-sided low back pain with left-sided sciatica     Had mild improvement with physical therapy but still now causing daily pain as well as limitations to activities of daily living.  As such we will get MRI for further evaluation.  Pain upon results will determine next steps.         Relevant Orders    MR Lumbar Spine w/o Contrast       Respiratory    Seasonal allergic rhinitis due to pollen     Controlled with over-the-counter cetirizine 10 mg daily.  Encouraged to continue.            Endocrine    Hyperlipidemia     Tolerating Zocor without diffuse muscle aches.  Will recheck lipid panel as per guidance as well as ALT and adjust as needed.         Relevant Orders    ALT    Lipid Profile       Circulatory    Essential hypertension     Below goal of 140/90 on current dosing of losartan 100 mg daily.  We will continue.         Relevant Orders    Basic metabolic panel       Musculoskeletal and Integumentary    Epidermal cyst     Upper left arm.  We will continue observation.  If becomes more of an annoyance, or not healed in 1 year will removed.         Relevant Medications    betamethasone valerate (VALISONE) 0.1 % external cream       Immune    Psoriasis     She always thought it was eczema.  However given the appearance and location of this actually is psoriasis.  Will use topical steroids for flares, continue daily emollients.  Will also check for possible underlying rheumatological systemic with labs as per orders.          "Relevant Medications    betamethasone valerate (VALISONE) 0.1 % external cream    Other Relevant Orders    Anti Nuclear Jackie IgG by IFA with Reflex    ESR: Erythrocyte sedimentation rate    CRP, inflammation    Cyclic Citrullinated Peptide Antibody IgG      Other Visit Diagnoses     Routine general medical examination at a health care facility    -  Primary    Relevant Orders    REVIEW OF HEALTH MAINTENANCE PROTOCOL ORDERS (Completed)    Diabetes mellitus screening        Relevant Orders    Basic metabolic panel    Encounter for screening mammogram for breast cancer        Relevant Orders    MA SCREENING DIGITAL BILAT - Future  (s+30)    Lipid screening        Relevant Orders    Lipid Profile          Patient has been advised of split billing requirements and indicates understanding: Yes  COUNSELING:  Reviewed preventive health counseling, as reflected in patient instructions       Regular exercise       Healthy diet/nutrition       Advance Care Planning    Estimated body mass index is 27.9 kg/m  as calculated from the following:    Height as of this encounter: 1.581 m (5' 2.25\").    Weight as of this encounter: 69.8 kg (153 lb 12.8 oz).    Weight management plan: Discussed healthy diet and exercise guidelines    She reports that she has quit smoking. Her smoking use included cigarettes and cigarettes. She has a 15.00 pack-year smoking history. She has never used smokeless tobacco.      Counseling Resources:  ATP IV Guidelines  Pooled Cohorts Equation Calculator  Breast Cancer Risk Calculator  BRCA-Related Cancer Risk Assessment: FHS-7 Tool  FRAX Risk Assessment  ICSI Preventive Guidelines  Dietary Guidelines for Americans, 2010  USDA's MyPlate  ASA Prophylaxis  Lung CA Screening    Porter Morales DO  [facility]  "

## 2021-07-28 NOTE — ASSESSMENT & PLAN NOTE
Upper left arm.  We will continue observation.  If becomes more of an annoyance, or not healed in 1 year will removed.

## 2021-07-28 NOTE — ASSESSMENT & PLAN NOTE
She always thought it was eczema.  However given the appearance and location of this actually is psoriasis.  Will use topical steroids for flares, continue daily emollients.  Will also check for possible underlying rheumatological systemic with labs as per orders.

## 2021-07-28 NOTE — ASSESSMENT & PLAN NOTE
Tolerating Zocor without diffuse muscle aches.  Will recheck lipid panel as per guidance as well as ALT and adjust as needed.

## 2021-07-29 LAB — CCP AB SER IA-ACNC: <0.5 U/ML

## 2021-07-30 LAB — ANA SER QL IF: NEGATIVE

## 2021-08-08 ENCOUNTER — HOSPITAL ENCOUNTER (OUTPATIENT)
Dept: MRI IMAGING | Facility: HOSPITAL | Age: 61
Discharge: HOME | End: 2021-08-08
Attending: FAMILY MEDICINE | Admitting: FAMILY MEDICINE
Payer: COMMERCIAL

## 2021-08-08 DIAGNOSIS — M54.42 CHRONIC LEFT-SIDED LOW BACK PAIN WITH LEFT-SIDED SCIATICA: ICD-10-CM

## 2021-08-08 DIAGNOSIS — G89.29 CHRONIC LEFT-SIDED LOW BACK PAIN WITH LEFT-SIDED SCIATICA: ICD-10-CM

## 2021-08-08 PROCEDURE — 72148 MRI LUMBAR SPINE W/O DYE: CPT

## 2021-08-10 ENCOUNTER — MYC MEDICAL ADVICE (OUTPATIENT)
Dept: FAMILY MEDICINE | Facility: CLINIC | Age: 61
End: 2021-08-10

## 2021-08-10 DIAGNOSIS — G89.29 CHRONIC LEFT-SIDED LOW BACK PAIN WITH LEFT-SIDED SCIATICA: Primary | ICD-10-CM

## 2021-08-10 DIAGNOSIS — M54.42 CHRONIC LEFT-SIDED LOW BACK PAIN WITH LEFT-SIDED SCIATICA: Primary | ICD-10-CM

## 2021-08-11 NOTE — TELEPHONE ENCOUNTER
Problem List Items Addressed This Visit        Nervous and Auditory    Chronic left-sided low back pain with left-sided sciatica - Primary     MRI reviewed. Findings consistent with pain and dysfunction she is experiencing. Referral to [facility] placed.         Relevant Orders    Spine Referral

## 2021-08-11 NOTE — ASSESSMENT & PLAN NOTE
MRI reviewed. Findings consistent with pain and dysfunction she is experiencing. Referral to [facility] Center placed.

## 2021-08-23 ENCOUNTER — OFFICE VISIT (OUTPATIENT)
Dept: PHYSICAL MEDICINE AND REHAB | Facility: CLINIC | Age: 61
End: 2021-08-23
Attending: FAMILY MEDICINE
Payer: COMMERCIAL

## 2021-08-23 VITALS
WEIGHT: 155 LBS | DIASTOLIC BLOOD PRESSURE: 90 MMHG | BODY MASS INDEX: 28.52 KG/M2 | HEIGHT: 62 IN | SYSTOLIC BLOOD PRESSURE: 168 MMHG

## 2021-08-23 DIAGNOSIS — M99.03 SOMATIC DYSFUNCTION OF LUMBAR REGION: ICD-10-CM

## 2021-08-23 DIAGNOSIS — M79.18 MYOFASCIAL PAIN: ICD-10-CM

## 2021-08-23 DIAGNOSIS — M43.16 SPONDYLOLISTHESIS OF LUMBAR REGION: ICD-10-CM

## 2021-08-23 DIAGNOSIS — M99.06 SOMATIC DYSFUNCTION OF LOWER EXTREMITY: ICD-10-CM

## 2021-08-23 DIAGNOSIS — M48.061 STENOSIS OF LATERAL RECESS OF LUMBAR SPINE: ICD-10-CM

## 2021-08-23 DIAGNOSIS — M99.05 SOMATIC DYSFUNCTION OF PELVIS REGION: ICD-10-CM

## 2021-08-23 DIAGNOSIS — M54.50 LUMBAR SPINE PAIN: Primary | ICD-10-CM

## 2021-08-23 DIAGNOSIS — M99.02 SOMATIC DYSFUNCTION OF THORACIC REGION: ICD-10-CM

## 2021-08-23 DIAGNOSIS — M47.816 LUMBAR FACET ARTHROPATHY: ICD-10-CM

## 2021-08-23 DIAGNOSIS — M99.04 SOMATIC DYSFUNCTION OF SACROILIAC JOINT: ICD-10-CM

## 2021-08-23 PROCEDURE — 99214 OFFICE O/P EST MOD 30 MIN: CPT | Mod: 25 | Performed by: PHYSICAL MEDICINE & REHABILITATION

## 2021-08-23 PROCEDURE — 98927 OSTEOPATH MANJ 5-6 REGIONS: CPT | Performed by: PHYSICAL MEDICINE & REHABILITATION

## 2021-08-23 RX ORDER — MELOXICAM 15 MG/1
7.5-15 TABLET ORAL DAILY PRN
Qty: 30 TABLET | Refills: 1 | Status: SHIPPED | OUTPATIENT
Start: 2021-08-23 | End: 2022-08-01

## 2021-08-23 ASSESSMENT — PAIN SCALES - GENERAL: PAINLEVEL: NO PAIN (1)

## 2021-08-23 ASSESSMENT — MIFFLIN-ST. JEOR: SCORE: 1215.3

## 2021-08-23 NOTE — LETTER
8/23/2021         RE: Ashleigh Chirinos  [address] [place], [place] [address]        Dear Colleague,    Thank you for referring your patient, Ashleigh Chirinos, to the [facility] [place]. Please see a copy of my visit note below.    Assessment/Plan:      Ashleigh was seen today for back pain.    Diagnoses and all orders for this visit:    Lumbar spine pain  -     meloxicam (MOBIC) 15 MG tablet; Take 0.5-1 tablets (7.5-15 mg) by mouth daily as needed for pain    Lumbar facet arthropathy  -     meloxicam (MOBIC) 15 MG tablet; Take 0.5-1 tablets (7.5-15 mg) by mouth daily as needed for pain  -     Physical Therapy Referral; Future  -     XR Lumbar Bending Only 2/3 Views; Future    Spondylolisthesis of lumbar region  -     Physical Therapy Referral; Future  -     XR Lumbar Bending Only 2/3 Views; Future    Stenosis of lateral recess of lumbar spine    Myofascial pain    Somatic dysfunction of thoracic region  -     OSTEOPATHIC MANIP,5-6 BODY REGN    Somatic dysfunction of lumbar region  -     OSTEOPATHIC MANIP,5-6 BODY REGN    Somatic dysfunction of sacroiliac joint  -     OSTEOPATHIC MANIP,5-6 BODY REGN    Somatic dysfunction of pelvis region  -     OSTEOPATHIC MANIP,5-6 BODY REGN    Somatic dysfunction of lower extremity  -     OSTEOPATHIC MANIP,5-6 BODY REGN    Other orders  -     Spine Referral         Assessment: Pleasant 61 year old female with a past medical history of hypertension hyperlipidemia with:    1.  1 year history of significant left low back and gluteal/proximal radicular pain which had improved and flared again over the past 2 months.  Consistent with facet arthropathy versus proximal radicular pain.  She has moderate facet arthropathy at L4-5 with mild spondylolisthesis and some mild left lateral recess stenosis approximately the left L5 nerve.    2.  Myofascial pain left gluteal region.    3.  Somatic dysfunctions of thethoracic spine, lumbar spine, sacrum, pelvis, lower extremities  "that contribute to the patient's pain complaints.      Discussion:    1.  I discussed the diagnosis and treatment options.  We discussed the options of returning to physical therapy, osteopathic manipulation, medications, imaging, injections and surgical evaluation.  She wants to start conservatively.    2.  Start with flexion-extension x-rays lumbar spine to evaluate for instability of the L4-5 spondylolisthesis.    3.  Trial Osteopathic manipulative medicine today.  She agrees to proceed.  Please see attached procedure note.    4.  Recommend 2-4 more visits of physical therapy now that we have a definitive diagnosis of the spondylolisthesis.  Core program lumbar stabilization and nerve glides.  Can progress to MedX therapy if she tolerates this well.    5.  Trial meloxicam 7.5-15 mg daily as needed     6. can consider lumbar facet injection versus lumbar epidural in the future.    7.  Follow-up 6 weeks      It was our pleasure caring for your patient today, if there any questions or concerns please do not hesitate to contact us.      Subjective:   Patient ID: Ashleigh Chirinos is a 61 year old female.    History of Present Illness: Patient presents at the request of Dr. Morales for evaluation of low back pain.  She began having pain about 1 year ago.  She was on a raft while swimming and slipped falling on her buttock.  She had fairly significant pain at that time into the left gluteal region was seeing physical therapy and subsequently chiropractor for 6 to 7 months and had improvement.  She was doing fairly well until this summer when she was doing some activities at the cabin such as cleaning and bending and twisting.  Her back \"went out \"again.  She began having pain over the left PSIS into the left gluteal region.  Does not radiate distally into the leg with no numbness tingling or weakness.  Her pain has improved some but is still an issue with prolonged standing bending or twisting.  It is limiting her activities " such as biking and golf seems to be better with walking and takes ibuprofen.  Feels a pinch in the low back and gluteal region.  Pain is a 10/10 at worst 1/10 today and at best.      Imaging: *MRI report and images were personally reviewed and discussed with the patient.  A plastic model was utilized during the discussion.  MRI of the lumbar spine personally reviewed.  Shows moderate facet arthropathy with edema L4-5 facets with some left greater than right lateral recess stenosis relatively mild but does approximate the left L5 nerve.  Slight spondylolisthesis L4 and L5       Review of Systems: Complains of weight gain, sciatica, skin itching.  Denies headache, change in vision, chest pain, shortness of breath, abdominal pain, nausea vomiting, bowel or bladder incontinence, skin issues.  Remainder of 12 point review systems negative unless listed above.    Past Medical History:   Diagnosis Date     Chronic allergic conjunctivitis 1/29/2020     High cholesterol      Hypertension      Pityriasis rosea 1/29/2020     Seasonal allergic rhinitis due to pollen 3/27/2020       Family History   Problem Relation Age of Onset     Heart Disease Mother      Hypertension Mother      Alcoholism Father      No Known Problems Sister      No Known Problems Brother      No Known Problems Brother      Breast Cancer No family hx of          Social History     Socioeconomic History     Marital status:      Spouse name: Travis     Number of children: 2     Years of education: 18     Highest education level: Bachelor's degree (e.g., BA, AB, BS)   Occupational History     None   Tobacco Use     Smoking status: Former Smoker     Packs/day: 1.00     Years: 15.00     Pack years: 15.00     Types: Cigarettes     Smokeless tobacco: Never Used   Vaping Use     Vaping Use: Never used   Substance and Sexual Activity     Alcohol use: Yes     Alcohol/week: 5.0 standard drinks     Types: 5 Cans of beer per week     Drug use: Not Currently      "Sexual activity: Yes     Partners: Male     Birth control/protection: Surgical     Comment: Hysterectomy   Other Topics Concern     Parent/sibling w/ CABG, MI or angioplasty before 65F 55M? Not Asked   Social History Narrative     None     Social Determinants of Health     Financial Resource Strain:      Difficulty of Paying Living Expenses:    Food Insecurity:      Worried About Running Out of Food in the Last Year:      Ran Out of Food in the Last Year:    Transportation Needs:      Lack of Transportation (Medical):      Lack of Transportation (Non-Medical):    Physical Activity:      Days of Exercise per Week:      Minutes of Exercise per Session:    Stress:      Feeling of Stress :    Social Connections:      Frequency of Communication with Friends and Family:      Frequency of Social Gatherings with Friends and Family:      Attends Spiritism Services:      Active Member of Clubs or Organizations:      Attends Club or Organization Meetings:      Marital Status:    Intimate Partner Violence:      Fear of Current or Ex-Partner:      Emotionally Abused:      Physically Abused:      Sexually Abused:      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  No tobacco.  Drinks alcohol occasionally.      The following portions of the patient's history were reviewed and updated as appropriate: allergies, current medications, past family history, past medical history, past social history, past surgical history and problem list.    Oswestry (MIGUEL) Questionnaire    OSWESTRY DISABILITY INDEX 8/23/2021   Count 10   Sum 10   Oswestry Score (%) 20   Some recent data might be hidden       Neck Disability Index:  No flowsheet data found.      WHO 5: 22            Objective:   Physical Exam:    Ht 5' 2.25\" (1.581 m)   Wt 155 lb (70.3 kg)   LMP  (LMP Unknown)   BMI 28.12 kg/m    Body mass index is 28.12 kg/m .      General:  Well-appearing female in no acute distress.  Pleasant, " cooperative, and interactive throughout the examination and interview.  CV: No lower extremity edema on inspection or paltation.  Lymphatics: No cervical lymphadenopathy palpated. Eyes: sclera clear. Skin: No rashes or lesions seen over the head/neck, hairline, arms, legs, trunk.  Respirations unlabored.  MSK: Gait is nonantalgic.  Able to heel-toe walk without difficulty.  Negative Romberg.  Spine: normal AP curves of the C, T, and L spine.  Minimal decreased lumbar flexion finger to floor testing.  Palpation: Tenderness to palpation over left sciatic notch left PSIS.  Extremities: Full range of motion of the elbows, and wrists with no effusions or tenderness to palpation.   Full range of motion of the hips, knees, and ankles with no effusions or tenderness to palpation.  Negative scour maneuver and Александр's test bilaterally. No hypermobility of the upper or lower extremities.  Neurologic exam: Mental status: Patient is alert and oriented with normal affect.  Attention, knowledge, memory, and language are intact.  Normal coordination throughout the examination.  Reflexes are 2+ and symmetric biceps, triceps, brachioradialis, patellar, and Achilles with down-going toes and Negative Arias's.  Sensation is intact to light touch throughout the upper and lower extremities bilaterally.  Manual muscle testing reveals 5 out of 5 in the hip flexors, knee flexors/extensors, ankle plantar flexors, ankle  dorsiflexors, and EHL.  Upper extremities: Grossly normal strength . Normal muscle bulk and tone in the arms and legs.    Equivocal supine straight leg raise on the left negative right.      Structural exam:  Thoracic spine:  T12 rotated left sidebent left. Lumbar spine: L5 rotated right sidebent left. Pelvis: Right innominate upslip, anterior inferior right innominate. Sacrum: Left right backward sacral torsion. Lower extremity: Hypertonic hip flexors and quadriceps bilaterally.     Procedure:    After discussing the  risks and benefits of osteopathic manipulative medicine, verbal consent was obtained. The somatic dysfunctions listed above were treated with the following techniques:   Thoracic spine: Lateral, muscle energy.  Lumbar spine: Soft tissue and lateral recumbent muscle energy. Pelvis: Still technique and Isometrics. Sacrum: Muscle energy lower extremities: Muscle energy.    The patient tolerated the procedure well and had improved range of motion in all areas treated prior to leaving the clinic.          Again, thank you for allowing me to participate in the care of your patient.        Sincerely,        Roverto Barkley, DO

## 2021-08-23 NOTE — PROGRESS NOTES
Assessment/Plan:      Ashleigh was seen today for back pain.    Diagnoses and all orders for this visit:    Lumbar spine pain  -     meloxicam (MOBIC) 15 MG tablet; Take 0.5-1 tablets (7.5-15 mg) by mouth daily as needed for pain    Lumbar facet arthropathy  -     meloxicam (MOBIC) 15 MG tablet; Take 0.5-1 tablets (7.5-15 mg) by mouth daily as needed for pain  -     Physical Therapy Referral; Future  -     XR Lumbar Bending Only 2/3 Views; Future    Spondylolisthesis of lumbar region  -     Physical Therapy Referral; Future  -     XR Lumbar Bending Only 2/3 Views; Future    Stenosis of lateral recess of lumbar spine    Myofascial pain    Somatic dysfunction of thoracic region  -     OSTEOPATHIC MANIP,5-6 BODY REGN    Somatic dysfunction of lumbar region  -     OSTEOPATHIC MANIP,5-6 BODY REGN    Somatic dysfunction of sacroiliac joint  -     OSTEOPATHIC MANIP,5-6 BODY REGN    Somatic dysfunction of pelvis region  -     OSTEOPATHIC MANIP,5-6 BODY REGN    Somatic dysfunction of lower extremity  -     OSTEOPATHIC MANIP,5-6 BODY REGN    Other orders  -     Spine Referral         Assessment: Pleasant 61 year old female with a past medical history of hypertension hyperlipidemia with:    1.  1 year history of significant left low back and gluteal/proximal radicular pain which had improved and flared again over the past 2 months.  Consistent with facet arthropathy versus proximal radicular pain.  She has moderate facet arthropathy at L4-5 with mild spondylolisthesis and some mild left lateral recess stenosis approximately the left L5 nerve.    2.  Myofascial pain left gluteal region.    3.  Somatic dysfunctions of thethoracic spine, lumbar spine, sacrum, pelvis, lower extremities that contribute to the patient's pain complaints.      Discussion:    1.  I discussed the diagnosis and treatment options.  We discussed the options of returning to physical therapy, osteopathic manipulation, medications, imaging, injections and  "surgical evaluation.  She wants to start conservatively.    2.  Start with flexion-extension x-rays lumbar spine to evaluate for instability of the L4-5 spondylolisthesis.    3.  Trial Osteopathic manipulative medicine today.  She agrees to proceed.  Please see attached procedure note.    4.  Recommend 2-4 more visits of physical therapy now that we have a definitive diagnosis of the spondylolisthesis.  Core program lumbar stabilization and nerve glides.  Can progress to MedX therapy if she tolerates this well.    5.  Trial meloxicam 7.5-15 mg daily as needed     6. can consider lumbar facet injection versus lumbar epidural in the future.    7.  Follow-up 6 weeks      It was our pleasure caring for your patient today, if there any questions or concerns please do not hesitate to contact us.      Subjective:   Patient ID: Ashleigh Chirinos is a 61 year old female.    History of Present Illness: Patient presents at the request of Dr. Morales for evaluation of low back pain.  She began having pain about 1 year ago.  She was on a raft while swimming and slipped falling on her buttock.  She had fairly significant pain at that time into the left gluteal region was seeing physical therapy and subsequently chiropractor for 6 to 7 months and had improvement.  She was doing fairly well until this summer when she was doing some activities at the cabin such as cleaning and bending and twisting.  Her back \"went out \"again.  She began having pain over the left PSIS into the left gluteal region.  Does not radiate distally into the leg with no numbness tingling or weakness.  Her pain has improved some but is still an issue with prolonged standing bending or twisting.  It is limiting her activities such as biking and golf seems to be better with walking and takes ibuprofen.  Feels a pinch in the low back and gluteal region.  Pain is a 10/10 at worst 1/10 today and at best.      Imaging: *MRI report and images were personally reviewed and " discussed with the patient.  A plastic model was utilized during the discussion.  MRI of the lumbar spine personally reviewed.  Shows moderate facet arthropathy with edema L4-5 facets with some left greater than right lateral recess stenosis relatively mild but does approximate the left L5 nerve.  Slight spondylolisthesis L4 and L5       Review of Systems: Complains of weight gain, sciatica, skin itching.  Denies headache, change in vision, chest pain, shortness of breath, abdominal pain, nausea vomiting, bowel or bladder incontinence, skin issues.  Remainder of 12 point review systems negative unless listed above.    Past Medical History:   Diagnosis Date     Chronic allergic conjunctivitis 1/29/2020     High cholesterol      Hypertension      Pityriasis rosea 1/29/2020     Seasonal allergic rhinitis due to pollen 3/27/2020       Family History   Problem Relation Age of Onset     Heart Disease Mother      Hypertension Mother      Alcoholism Father      No Known Problems Sister      No Known Problems Brother      No Known Problems Brother      Breast Cancer No family hx of          Social History     Socioeconomic History     Marital status:      Spouse name: Travis     Number of children: 2     Years of education: 18     Highest education level: Bachelor's degree (e.g., BA, AB, BS)   Occupational History     None   Tobacco Use     Smoking status: Former Smoker     Packs/day: 1.00     Years: 15.00     Pack years: 15.00     Types: Cigarettes     Smokeless tobacco: Never Used   Vaping Use     Vaping Use: Never used   Substance and Sexual Activity     Alcohol use: Yes     Alcohol/week: 5.0 standard drinks     Types: 5 Cans of beer per week     Drug use: Not Currently     Sexual activity: Yes     Partners: Male     Birth control/protection: Surgical     Comment: Hysterectomy   Other Topics Concern     Parent/sibling w/ CABG, MI or angioplasty before 65F 55M? Not Asked   Social History Narrative     None     Social  "Determinants of Health     Financial Resource Strain:      Difficulty of Paying Living Expenses:    Food Insecurity:      Worried About Running Out of Food in the Last Year:      Ran Out of Food in the Last Year:    Transportation Needs:      Lack of Transportation (Medical):      Lack of Transportation (Non-Medical):    Physical Activity:      Days of Exercise per Week:      Minutes of Exercise per Session:    Stress:      Feeling of Stress :    Social Connections:      Frequency of Communication with Friends and Family:      Frequency of Social Gatherings with Friends and Family:      Attends Voodoo Services:      Active Member of Clubs or Organizations:      Attends Club or Organization Meetings:      Marital Status:    Intimate Partner Violence:      Fear of Current or Ex-Partner:      Emotionally Abused:      Physically Abused:      Sexually Abused:      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  No tobacco.  Drinks alcohol occasionally.      The following portions of the patient's history were reviewed and updated as appropriate: allergies, current medications, past family history, past medical history, past social history, past surgical history and problem list.    Oswestry (MIGUEL) Questionnaire    OSWESTRY DISABILITY INDEX 8/23/2021   Count 10   Sum 10   Oswestry Score (%) 20   Some recent data might be hidden       Neck Disability Index:  No flowsheet data found.      WHO 5: 22            Objective:   Physical Exam:    Ht 5' 2.25\" (1.581 m)   Wt 155 lb (70.3 kg)   LMP  (LMP Unknown)   BMI 28.12 kg/m    Body mass index is 28.12 kg/m .      General:  Well-appearing female in no acute distress.  Pleasant, cooperative, and interactive throughout the examination and interview.  CV: No lower extremity edema on inspection or paltation.  Lymphatics: No cervical lymphadenopathy palpated. Eyes: sclera clear. Skin: No rashes or lesions seen over the " head/neck, hairline, arms, legs, trunk.  Respirations unlabored.  MSK: Gait is nonantalgic.  Able to heel-toe walk without difficulty.  Negative Romberg.  Spine: normal AP curves of the C, T, and L spine.  Minimal decreased lumbar flexion finger to floor testing.  Palpation: Tenderness to palpation over left sciatic notch left PSIS.  Extremities: Full range of motion of the elbows, and wrists with no effusions or tenderness to palpation.   Full range of motion of the hips, knees, and ankles with no effusions or tenderness to palpation.  Negative scour maneuver and Александр's test bilaterally. No hypermobility of the upper or lower extremities.  Neurologic exam: Mental status: Patient is alert and oriented with normal affect.  Attention, knowledge, memory, and language are intact.  Normal coordination throughout the examination.  Reflexes are 2+ and symmetric biceps, triceps, brachioradialis, patellar, and Achilles with down-going toes and Negative Arias's.  Sensation is intact to light touch throughout the upper and lower extremities bilaterally.  Manual muscle testing reveals 5 out of 5 in the hip flexors, knee flexors/extensors, ankle plantar flexors, ankle  dorsiflexors, and EHL.  Upper extremities: Grossly normal strength . Normal muscle bulk and tone in the arms and legs.    Equivocal supine straight leg raise on the left negative right.      Structural exam:  Thoracic spine:  T12 rotated left sidebent left. Lumbar spine: L5 rotated right sidebent left. Pelvis: Right innominate upslip, anterior inferior right innominate. Sacrum: Left right backward sacral torsion. Lower extremity: Hypertonic hip flexors and quadriceps bilaterally.     Procedure:    After discussing the risks and benefits of osteopathic manipulative medicine, verbal consent was obtained. The somatic dysfunctions listed above were treated with the following techniques:   Thoracic spine: Lateral, muscle energy.  Lumbar spine: Soft tissue and  lateral recumbent muscle energy. Pelvis: Still technique and Isometrics. Sacrum: Muscle energy lower extremities: Muscle energy.    The patient tolerated the procedure well and had improved range of motion in all areas treated prior to leaving the clinic.

## 2021-08-23 NOTE — PATIENT INSTRUCTIONS
1. A physical therapy order was provided for you today.  You will be contacted by physical therapy.  If nobody contacts you within 3 to 5 days, please contact the clinic at [phone].  It will be very important for you to do your physical therapy exercises on a regular basis to decrease your pain and prevent future pain flares.    2. Osteopathic manual medicine today    3. Meloxicam (which is an anti-inflammatory) medication is prescribed today. Take 1/2-1 tablet daily as needed for pain. This medication should be taken with food and water to prevent any stomach upset. Do not take ibuprofen/Advil/Motrin/Aleve/naproxen while you take Meloxicam. Please call if you have any side effects.  4. An xray was ordered for you today.  Please call Radiology at [phone] or your primary clinic to schedule.

## 2021-08-24 ENCOUNTER — ANCILLARY PROCEDURE (OUTPATIENT)
Dept: GENERAL RADIOLOGY | Facility: CLINIC | Age: 61
End: 2021-08-24
Attending: PHYSICAL MEDICINE & REHABILITATION
Payer: COMMERCIAL

## 2021-08-24 ENCOUNTER — TELEPHONE (OUTPATIENT)
Dept: PHYSICAL MEDICINE AND REHAB | Facility: CLINIC | Age: 61
End: 2021-08-24

## 2021-08-24 DIAGNOSIS — M47.816 LUMBAR FACET ARTHROPATHY: ICD-10-CM

## 2021-08-24 DIAGNOSIS — M43.16 SPONDYLOLISTHESIS OF LUMBAR REGION: ICD-10-CM

## 2021-08-24 PROCEDURE — 72120 X-RAY BEND ONLY L-S SPINE: CPT | Mod: TC | Performed by: RADIOLOGY

## 2021-08-24 NOTE — TELEPHONE ENCOUNTER
Phone call to patient to review results and provider's recommendations. Results given and explained. Discussed continuing with treatment plan as discussed at her appointment yesterday. Patient states she picked up the Meloxicam and read the insert. She is hesitant to use as she does have BP issues and they have been running high. Wondering about other options.

## 2021-08-24 NOTE — TELEPHONE ENCOUNTER
----- Message from Roverto Barkley DO sent at 8/24/2021  2:33 PM CDT -----   I have reviewed the lumbar x-rays were reviewed.  Mild to moderate facet arthritis in the lower lumbar spine with no instability from flexion to extension.    Continue plan of physical therapy and follow-up.

## 2021-08-25 ENCOUNTER — HOSPITAL ENCOUNTER (OUTPATIENT)
Dept: PHYSICAL THERAPY | Facility: REHABILITATION | Age: 61
End: 2021-08-25
Attending: PHYSICAL MEDICINE & REHABILITATION
Payer: COMMERCIAL

## 2021-08-25 DIAGNOSIS — M43.16 SPONDYLOLISTHESIS OF LUMBAR REGION: ICD-10-CM

## 2021-08-25 DIAGNOSIS — M47.816 LUMBAR FACET ARTHROPATHY: ICD-10-CM

## 2021-08-25 PROCEDURE — 97161 PT EVAL LOW COMPLEX 20 MIN: CPT | Mod: GP

## 2021-08-25 PROCEDURE — 97110 THERAPEUTIC EXERCISES: CPT | Mod: GP

## 2021-08-25 NOTE — PROGRESS NOTES
08/25/21 0900   General Information   Type of Visit Initial OP Ortho PT Evaluation   Start of Care Date 08/25/21   Referring Physician Roverto Barkley DO   Patient/Family Goals Statement Patient would like to return to normal daily activities and exercise without an incease in low back pain   Orders Evaluate and Treat   Date of Order 08/23/21   Certification Required? No       Present No   Body Part(s)   Body Part(s) Lumbar Spine/SI   Presentation and Etiology   Pertinent history of current problem (include personal factors and/or comorbidities that impact the POC) Pain started about a year ago and started to get pain down the leg. Went to therapy which did initially help. Pain [place] up [place] Has improved since then. Patient describes the pain as a pinching and shooting. Patient is generally fairly active. Twisting, bending, and moving aggravates the pain. Walking on uneven ground, doing household chores. Currently rates pain at 2/10, at worst 10/10. Patient does get some radiating pain down into the buttock. Patient uses ice and rest to alleviate pain, will occasionally use OTC medications.    Impairments A. Pain;D. Decreased ROM;E. Decreased flexibility;F. Decreased strength and endurance   Functional Limitations perform activities of daily living;perform desired leisure / sports activities   Symptom Location Left Low Back/SI Joint   Pain quality A. Sharp;C. Aching   Frequency of pain/symptoms B. Intermittent   Pain/symptoms are: Other   Pain symptoms comment Patient gets worsening symptoms with activity   Pain/symptoms exacerbated by D. Carrying;I. Bending;J. ADL;K. Home tasks   Pain/symptoms eased by C. Rest   Current Level of Function   Patient role/employment history F. Retired   [facility]   Current equipment-ADL None   Fall Risk Screen   Fall screen completed by PT   Have you fallen 2 or more times in the  past year? No   Have you fallen and had an injury in the past year? Yes  (Fell and injured tailbone 1 year ago)   Is patient a fall risk? No   Abuse Screen (yes response referral indicated)   Feels Unsafe at Home or Work/School no   Feels Threatened by Someone no   Does Anyone Try to Keep You From Having Contact with Others or Doing Things Outside Your Home? no   Physical Signs of Abuse Present no   System Outcome Measures   Outcome Measures Low Back Pain (see Oswestry and Jenaro)  (34%)   Lumbar Spine/SI Objective Findings   Posture Standing: Forward shoulders, thoracic kyphosis, slight anterior pelvic tilt   Flexion ROM Fingertip to Floor: 9.5 inches   Extension ROM 10 degrees   Right Side Bending ROM Fingertips to mid-thigh   Left Side Bending ROM Fingertips to mid-thigh   Lumbar ROM Comment Patient experienced tightness with side bending ROM bilaterally   Hip Flexion (L2) Strength 5/5 bilaterally   Hip Abduction Strength Right: 4-/5; Left: 4-/5   Hip Extension Strength Right: 4/5; Left: 4-/5 (pain in left low back)   Knee Extension (L3) Strength Right: 5/5; Left: 5/5   Ankle Dorsiflexion (L4) Strength Right: 5/5; Left: 5/5   Ankle Plantar Flexion (S1) Strength Right: 5/5; Left: 5/5)   Hamstring Flexibility Right: -15 degrees; Left: -20 degrees   SLR Negative bilaterally   Slump Test Negative bilaterally   Palpation Tenderness throughout left lumbar paraspinals and gluteals   Planned Therapy Interventions   Planned Therapy Interventions ADL retraining;balance training;joint mobilization;manual therapy;neuromuscular re-education;ROM;strengthening;stretching   Planned Modality Interventions   Planned Modality Interventions Cryotherapy;Electrical stimulation;Hot packs   Clinical Impression   Criteria for Skilled Therapeutic Interventions Met yes, treatment indicated   PT Diagnosis Low back pain; core and gluteal muscle weakness; decreased lower extremity flexiblity   Clinical Decision Making (Complexity) Low  complexity   Therapy Frequency 1 time/week   Predicted Duration of Therapy Intervention (days/wks) 6 weeks   Risk & Benefits of therapy have been explained Yes   Patient, Family & other staff in agreement with plan of care Yes   Clinical Impression Comments Patient presents to physical therapy with signs and symptoms that include left low back pain,  core and gluteal strength bilaterally, and decreased core and lower extremity flexibility. Patient has difficulty completing daily activities and exercise requiring lfting, twisting, and bending. Patient would benefit from skilled physical therapy to address the aforementioned deficits highlighted in the initial evaluation to facilitate symptom improvement and full return to daily activities.    Education Assessment   Barriers to Learning No barriers   ORTHO GOALS   PT Ortho Eval Goals 1;2;3;4   Ortho Goal 1   Goal Identifier HEP   Goal Description Patient will demonstrate independence with home exercise program in 3 weeks.    Target Date 21   Ortho Goal 2   Goal Identifier Pain   Goal Description Patient will report no greater than 3/10 pain at any point throughout the day in 4 weeks.    Target Date 09/15/21   Ortho Goal 3   Goal Identifier Function   Goal Description Patient will demonstrate improved ability to complete house and yard work without an increase in pain.    Target Date 10/06/21   Ortho Goal 4   Goal Identifier Function   Goal Description Patient will demonstrate improved ability to walk on uneven surfaces without an increase in pain in order to complete outdoor chores and exercise.   Target Date 10/06/21   Total Evaluation Time   PT Eval, Low Complexity Minutes (92305) 51

## 2021-08-26 NOTE — TELEPHONE ENCOUNTER
Patient had sent a [employer] message to [facility] on 8/23. He did respond back to her on 8/24. She has since read that message.

## 2021-09-10 ENCOUNTER — HOSPITAL ENCOUNTER (OUTPATIENT)
Dept: PHYSICAL THERAPY | Facility: REHABILITATION | Age: 61
End: 2021-09-10
Payer: COMMERCIAL

## 2021-09-10 DIAGNOSIS — M54.42 CHRONIC LEFT-SIDED LOW BACK PAIN WITH LEFT-SIDED SCIATICA: Primary | ICD-10-CM

## 2021-09-10 DIAGNOSIS — G89.29 CHRONIC LEFT-SIDED LOW BACK PAIN WITH LEFT-SIDED SCIATICA: Primary | ICD-10-CM

## 2021-09-10 PROCEDURE — 97110 THERAPEUTIC EXERCISES: CPT | Mod: GP

## 2021-09-10 PROCEDURE — 97140 MANUAL THERAPY 1/> REGIONS: CPT | Mod: GP

## 2021-09-28 ENCOUNTER — ANCILLARY PROCEDURE (OUTPATIENT)
Dept: MAMMOGRAPHY | Facility: CLINIC | Age: 61
End: 2021-09-28
Attending: FAMILY MEDICINE
Payer: COMMERCIAL

## 2021-09-28 DIAGNOSIS — Z12.31 ENCOUNTER FOR SCREENING MAMMOGRAM FOR BREAST CANCER: ICD-10-CM

## 2021-09-28 PROCEDURE — 77067 SCR MAMMO BI INCL CAD: CPT

## 2021-10-05 ENCOUNTER — HOSPITAL ENCOUNTER (OUTPATIENT)
Dept: PHYSICAL THERAPY | Facility: REHABILITATION | Age: 61
End: 2021-10-05
Payer: COMMERCIAL

## 2021-10-05 DIAGNOSIS — M47.816 LUMBAR FACET ARTHROPATHY: ICD-10-CM

## 2021-10-05 DIAGNOSIS — M43.16 SPONDYLOLISTHESIS OF LUMBAR REGION: ICD-10-CM

## 2021-10-05 DIAGNOSIS — G89.29 CHRONIC LEFT-SIDED LOW BACK PAIN WITH LEFT-SIDED SCIATICA: Primary | ICD-10-CM

## 2021-10-05 DIAGNOSIS — M54.42 CHRONIC LEFT-SIDED LOW BACK PAIN WITH LEFT-SIDED SCIATICA: Primary | ICD-10-CM

## 2021-10-05 PROCEDURE — 97110 THERAPEUTIC EXERCISES: CPT | Mod: GP

## 2021-10-13 ENCOUNTER — IMMUNIZATION (OUTPATIENT)
Dept: FAMILY MEDICINE | Facility: CLINIC | Age: 61
End: 2021-10-13
Payer: COMMERCIAL

## 2021-10-13 PROCEDURE — 90682 RIV4 VACC RECOMBINANT DNA IM: CPT

## 2021-10-13 PROCEDURE — 90471 IMMUNIZATION ADMIN: CPT

## 2021-12-09 ENCOUNTER — IMMUNIZATION (OUTPATIENT)
Dept: NURSING | Facility: CLINIC | Age: 61
End: 2021-12-09
Payer: COMMERCIAL

## 2021-12-09 PROCEDURE — 91306 COVID-19,PF,MODERNA (18+ YRS BOOSTER .25ML): CPT

## 2021-12-09 PROCEDURE — 0064A COVID-19,PF,MODERNA (18+ YRS BOOSTER .25ML): CPT

## 2022-03-06 DIAGNOSIS — E78.5 HYPERLIPIDEMIA, UNSPECIFIED HYPERLIPIDEMIA TYPE: ICD-10-CM

## 2022-03-07 RX ORDER — SIMVASTATIN 40 MG
TABLET ORAL
Qty: 90 TABLET | Refills: 1 | Status: SHIPPED | OUTPATIENT
Start: 2022-03-07 | End: 2022-06-04

## 2022-03-08 NOTE — TELEPHONE ENCOUNTER
"Last Written Prescription Date:  06/15/2021  Last Fill Quantity: 90,  # refills: 2   Last office visit provider:  07/28/2021     Requested Prescriptions   Pending Prescriptions Disp Refills     simvastatin (ZOCOR) 40 MG tablet [Pharmacy Med Name: SIMVASTATIN 40MG TABLETS] 90 tablet 2     Sig: TAKE 1 TABLET(40 MG) BY MOUTH AT BEDTIME       Statins Protocol Passed - 3/6/2022  6:55 AM        Passed - LDL on file in past 12 months     Recent Labs   Lab Test 07/28/21  1051                Passed - No abnormal creatine kinase in past 12 months     No lab results found.             Passed - Recent (12 mo) or future (30 days) visit within the authorizing provider's specialty     Patient has had an office visit with the authorizing provider or a provider within the authorizing providers department within the previous 12 mos or has a future within next 30 days. See \"Patient Info\" tab in inbasket, or \"Choose Columns\" in Meds & Orders section of the refill encounter.              Passed - Medication is active on med list        Passed - Patient is age 18 or older        Passed - No active pregnancy on record        Passed - No positive pregnancy test in past 12 months             Aura Lewis 03/07/22 10:56 PM  "

## 2022-04-12 ENCOUNTER — NURSE TRIAGE (OUTPATIENT)
Dept: NURSING | Facility: CLINIC | Age: 62
End: 2022-04-12
Payer: COMMERCIAL

## 2022-04-12 ENCOUNTER — APPOINTMENT (OUTPATIENT)
Dept: CT IMAGING | Facility: HOSPITAL | Age: 62
End: 2022-04-12
Attending: EMERGENCY MEDICINE
Payer: COMMERCIAL

## 2022-04-12 ENCOUNTER — HOSPITAL ENCOUNTER (EMERGENCY)
Facility: HOSPITAL | Age: 62
Discharge: HOME | End: 2022-04-12
Attending: STUDENT IN AN ORGANIZED HEALTH CARE EDUCATION/TRAINING PROGRAM | Admitting: STUDENT IN AN ORGANIZED HEALTH CARE EDUCATION/TRAINING PROGRAM
Payer: COMMERCIAL

## 2022-04-12 VITALS
WEIGHT: 147 LBS | TEMPERATURE: 98.4 F | HEIGHT: 62 IN | SYSTOLIC BLOOD PRESSURE: 148 MMHG | BODY MASS INDEX: 27.05 KG/M2 | HEART RATE: 81 BPM | DIASTOLIC BLOOD PRESSURE: 81 MMHG | OXYGEN SATURATION: 95 % | RESPIRATION RATE: 16 BRPM

## 2022-04-12 DIAGNOSIS — K52.9 COLITIS: ICD-10-CM

## 2022-04-12 LAB
ALBUMIN SERPL-MCNC: 4.5 G/DL (ref 3.5–5)
ALP SERPL-CCNC: 73 U/L (ref 45–120)
ALT SERPL W P-5'-P-CCNC: 29 U/L (ref 0–45)
ANION GAP SERPL CALCULATED.3IONS-SCNC: 8 MMOL/L (ref 5–18)
APTT PPP: 24 SECONDS (ref 22–38)
AST SERPL W P-5'-P-CCNC: 25 U/L (ref 0–40)
BILIRUB DIRECT SERPL-MCNC: 0.2 MG/DL
BILIRUB SERPL-MCNC: 0.5 MG/DL (ref 0–1)
BUN SERPL-MCNC: 17 MG/DL (ref 8–22)
CALCIUM SERPL-MCNC: 10.7 MG/DL (ref 8.5–10.5)
CHLORIDE BLD-SCNC: 107 MMOL/L (ref 98–107)
CO2 SERPL-SCNC: 26 MMOL/L (ref 22–31)
CREAT SERPL-MCNC: 0.69 MG/DL (ref 0.6–1.1)
ERYTHROCYTE [DISTWIDTH] IN BLOOD BY AUTOMATED COUNT: 12.9 % (ref 10–15)
GFR SERPL CREATININE-BSD FRML MDRD: >90 ML/MIN/1.73M2
GLUCOSE BLD-MCNC: 130 MG/DL (ref 70–125)
HCT VFR BLD AUTO: 44.1 % (ref 35–47)
HGB BLD-MCNC: 14.7 G/DL (ref 11.7–15.7)
INR PPP: 1 (ref 0.85–1.15)
LIPASE SERPL-CCNC: <9 U/L (ref 0–52)
LIPASE SERPL-CCNC: <9 U/L (ref 0–52)
MCH RBC QN AUTO: 28.8 PG (ref 26.5–33)
MCHC RBC AUTO-ENTMCNC: 33.3 G/DL (ref 31.5–36.5)
MCV RBC AUTO: 87 FL (ref 78–100)
PLATELET # BLD AUTO: 373 10E3/UL (ref 150–450)
POTASSIUM BLD-SCNC: 4.4 MMOL/L (ref 3.5–5)
PROT SERPL-MCNC: 7.6 G/DL (ref 6–8)
RBC # BLD AUTO: 5.1 10E6/UL (ref 3.8–5.2)
SODIUM SERPL-SCNC: 141 MMOL/L (ref 136–145)
WBC # BLD AUTO: 13.1 10E3/UL (ref 4–11)

## 2022-04-12 PROCEDURE — 85730 THROMBOPLASTIN TIME PARTIAL: CPT | Performed by: EMERGENCY MEDICINE

## 2022-04-12 PROCEDURE — 258N000003 HC RX IP 258 OP 636: Performed by: STUDENT IN AN ORGANIZED HEALTH CARE EDUCATION/TRAINING PROGRAM

## 2022-04-12 PROCEDURE — 36415 COLL VENOUS BLD VENIPUNCTURE: CPT | Performed by: EMERGENCY MEDICINE

## 2022-04-12 PROCEDURE — 82248 BILIRUBIN DIRECT: CPT | Performed by: EMERGENCY MEDICINE

## 2022-04-12 PROCEDURE — 250N000011 HC RX IP 250 OP 636: Performed by: STUDENT IN AN ORGANIZED HEALTH CARE EDUCATION/TRAINING PROGRAM

## 2022-04-12 PROCEDURE — 96361 HYDRATE IV INFUSION ADD-ON: CPT

## 2022-04-12 PROCEDURE — 250N000011 HC RX IP 250 OP 636: Performed by: EMERGENCY MEDICINE

## 2022-04-12 PROCEDURE — 82947 ASSAY GLUCOSE BLOOD QUANT: CPT | Performed by: EMERGENCY MEDICINE

## 2022-04-12 PROCEDURE — 99285 EMERGENCY DEPT VISIT HI MDM: CPT | Mod: 25

## 2022-04-12 PROCEDURE — 83690 ASSAY OF LIPASE: CPT | Performed by: STUDENT IN AN ORGANIZED HEALTH CARE EDUCATION/TRAINING PROGRAM

## 2022-04-12 PROCEDURE — 83690 ASSAY OF LIPASE: CPT | Performed by: EMERGENCY MEDICINE

## 2022-04-12 PROCEDURE — 74174 CTA ABD&PLVS W/CONTRAST: CPT

## 2022-04-12 PROCEDURE — 96375 TX/PRO/DX INJ NEW DRUG ADDON: CPT

## 2022-04-12 PROCEDURE — 85610 PROTHROMBIN TIME: CPT | Performed by: EMERGENCY MEDICINE

## 2022-04-12 PROCEDURE — C9113 INJ PANTOPRAZOLE SODIUM, VIA: HCPCS | Performed by: EMERGENCY MEDICINE

## 2022-04-12 PROCEDURE — 85027 COMPLETE CBC AUTOMATED: CPT | Performed by: EMERGENCY MEDICINE

## 2022-04-12 PROCEDURE — 96374 THER/PROPH/DIAG INJ IV PUSH: CPT | Mod: 59

## 2022-04-12 RX ORDER — FLUMAZENIL 0.1 MG/ML
0.2 INJECTION, SOLUTION INTRAVENOUS
Status: CANCELLED | OUTPATIENT
Start: 2022-04-12 | End: 2022-04-13

## 2022-04-12 RX ORDER — PROCHLORPERAZINE MALEATE 10 MG
10 TABLET ORAL EVERY 6 HOURS PRN
Status: CANCELLED | OUTPATIENT
Start: 2022-04-12

## 2022-04-12 RX ORDER — IOPAMIDOL 755 MG/ML
100 INJECTION, SOLUTION INTRAVASCULAR ONCE
Status: COMPLETED | OUTPATIENT
Start: 2022-04-12 | End: 2022-04-12

## 2022-04-12 RX ORDER — ONDANSETRON 2 MG/ML
4 INJECTION INTRAMUSCULAR; INTRAVENOUS ONCE
Status: COMPLETED | OUTPATIENT
Start: 2022-04-12 | End: 2022-04-12

## 2022-04-12 RX ORDER — ONDANSETRON 4 MG/1
4 TABLET, ORALLY DISINTEGRATING ORAL EVERY 6 HOURS PRN
Status: CANCELLED | OUTPATIENT
Start: 2022-04-12

## 2022-04-12 RX ORDER — ONDANSETRON 4 MG/1
4 TABLET, ORALLY DISINTEGRATING ORAL EVERY 8 HOURS PRN
Qty: 10 TABLET | Refills: 0 | Status: SHIPPED | OUTPATIENT
Start: 2022-04-12 | End: 2022-04-15

## 2022-04-12 RX ORDER — NALOXONE HYDROCHLORIDE 0.4 MG/ML
0.4 INJECTION, SOLUTION INTRAMUSCULAR; INTRAVENOUS; SUBCUTANEOUS
Status: CANCELLED | OUTPATIENT
Start: 2022-04-12

## 2022-04-12 RX ORDER — NALOXONE HYDROCHLORIDE 0.4 MG/ML
0.2 INJECTION, SOLUTION INTRAMUSCULAR; INTRAVENOUS; SUBCUTANEOUS
Status: CANCELLED | OUTPATIENT
Start: 2022-04-12

## 2022-04-12 RX ORDER — ONDANSETRON 2 MG/ML
4 INJECTION INTRAMUSCULAR; INTRAVENOUS EVERY 6 HOURS PRN
Status: CANCELLED | OUTPATIENT
Start: 2022-04-12

## 2022-04-12 RX ADMIN — PANTOPRAZOLE SODIUM 40 MG: 40 INJECTION, POWDER, FOR SOLUTION INTRAVENOUS at 12:12

## 2022-04-12 RX ADMIN — IOPAMIDOL 100 ML: 755 INJECTION, SOLUTION INTRAVENOUS at 13:02

## 2022-04-12 RX ADMIN — ONDANSETRON 4 MG: 2 INJECTION INTRAMUSCULAR; INTRAVENOUS at 13:05

## 2022-04-12 RX ADMIN — SODIUM CHLORIDE, POTASSIUM CHLORIDE, SODIUM LACTATE AND CALCIUM CHLORIDE 1000 ML: 600; 310; 30; 20 INJECTION, SOLUTION INTRAVENOUS at 13:06

## 2022-04-12 NOTE — ED PROVIDER NOTES
Emergency Department Encounter         FINAL IMPRESSION:  Ischemic colitis, dehydration        ED COURSE AND MEDICAL DECISION MAKING       ED Course as of 04/12/22 1306   Tue Apr 12, 2022   1246 Patient is a 61-year-old female history of hypertension hyperlipidemia here from home with abdominal pain melena and bloody stool.  Patient states last night she had severe abdominal cramping to the point where she fainted from the pain.  Became diaphoretic.  States at 1 point she had a large black bowel movement last night.  No history of GI bleeds in the past.  No fevers chills.  Mild nausea.  No vomiting.  States this morning she  continued to have abdominal cramping and had a bloody bowel movement.  States was bright red and very small.  Increased nausea.  No chest pain or trouble breathing.  Arrival here she looks well peer vitals are stable.  Heart and lungs are normal.  Abdomen is generally benign with some mild tenderness throughout.  She is nauseous.  Plan for fluids nausea medicine CT and reevaluate   -CT showing a [place] ischemic colitis type picture.  Discussed case with GI who is recommending continued fluids and outpatient follow-up with colonoscopy.  Patient's pain is controlled here fluids and Zofran.    12:32PM I met with the patient for the initial interview and physical examination. Discussed plan for treatment and workup in the ED. PPE: Provider wore surgical mask.        At the conclusion of the encounter I discussed the results of all the tests and the disposition. The questions were answered. The patient or family acknowledged understanding and was agreeable with the care plan.        MEDICATIONS GIVEN IN THE EMERGENCY DEPARTMENT:  Medications   pantoprazole (PROTONIX) IV push injection 40 mg (40 mg Intravenous Given 4/12/22 1212)   lactated ringers BOLUS 1,000 mL (1,000 mLs Intravenous New Bag 4/12/22 1306)   ondansetron (ZOFRAN) injection 4 mg (4 mg Intravenous Given 4/12/22 1305)  "  iopamidol (ISOVUE-370) solution 100 mL (100 mLs Intravenous Given 4/12/22 1302)       NEW PRESCRIPTIONS STARTED AT TODAY'S ED VISIT:  New Prescriptions    No medications on file       HPI     Patient information obtained from: patient    Use of Interpretor: N/A     Ashleigh Chirinos is a 61 year old female with a pertinent history of s/p hysterectomy and hypertension who presents to this ED with  for evaluation of abdominal pain and melena.     Per chart review, patient had a colonoscopy on 3/23/21. One polyp was identified in the rectum and removed with cold biopsy forceps. No other lesions were noted, no significant diverticular disease, no evidence for significant hemorrhoidal disease.     Patient reports sudden onset of severe lower abdominal pain at 0300 this morning, which woke her from sleep. She became diaphoretic, felt constipated, and tried to have a bowel movement several times unsuccessfully. When she was able to finally have a bowel movement, it was black diarrhea. Around that time, patient had a syncopal episode while on the toilet,  heard her fall. Currently has a headache but denies neck pain or other injury. Later this morning, she had another bowel movement which was not black, although it had bright red clots. Abdominal pain has been intermittent since it began, intensity waxes and wanes, currently has pain but more mild. Also reports associated nausea without vomiting. No history of abdominal surgeries. Endorses history of melena, although she attributes it to \"being sick\" at that time. No blood thinner. Denies chest pain, shortness of breath, hematemesis, urinary symptoms, or any other complaints at this time.    Social: Denies tobacco or drug use. Occasional alcohol use.     REVIEW OF SYSTEMS:  Review of Systems   Constitutional: Negative for fever, malaise  HEENT: Negative runny nose, sore throat, ear pain, neck pain  Respiratory: Negative for shortness of breath, cough, " "congestion  Cardiovascular: Negative for chest pain, leg edema  Gastrointestinal: Negative for abdominal distention or vomiting. Positive for abdominal pain (lower), constipation (since resolved), nausea, diarrhea/melena, and hematochezia.   Genitourinary: Negative for dysuria and hematuria.   Integument: Negative for rash, skin breakdown  Neurological: Negative for paresthesias, weakness, headache.  Musculoskeletal: Negative for joint pain, joint swelling      All other systems reviewed and are negative.      MEDICAL HISTORY     Past Medical History:   Diagnosis Date     Chronic allergic conjunctivitis 1/29/2020     High cholesterol      Hypertension      Pityriasis rosea 1/29/2020     Seasonal allergic rhinitis due to pollen 3/27/2020       Past Surgical History:   Procedure Laterality Date     HYSTERECTOMY, PAP NO LONGER INDICATED       TONSILLECTOMY & ADENOIDECTOMY       [facility] COLONOSCOPY W/WO BRUSH/WASH N/A 3/23/2021    Procedure: COLONOSCOPY;  Surgeon: Travis Wang MD;  [facility]: [facility];  Service: General       Social History     Tobacco Use     Smoking status: Former Smoker     Packs/day: 1.00     Years: 15.00     Pack years: 15.00     Types: Cigarettes     Smokeless tobacco: Never Used   Vaping Use     Vaping Use: Never used   Substance Use Topics     Alcohol use: Yes     Alcohol/week: 5.0 standard drinks     Types: 5 Cans of beer per week     Drug use: Not Currently       aspirin 81 MG EC tablet  betamethasone valerate (VALISONE) 0.1 % external cream  cetirizine (ZYRTEC) 10 MG tablet  losartan (COZAAR) 100 MG tablet  meloxicam (MOBIC) 15 MG tablet  multivitamin therapeutic tablet  simvastatin (ZOCOR) 40 MG tablet        PHYSICAL EXAM     BP (!) 142/69   Pulse 81   Temp 98.4  F (36.9  C) (Oral)   Resp 16   Ht 1.575 m (5' 2\")   Wt 66.7 kg (147 lb)   LMP  (LMP Unknown)   SpO2 95%   BMI 26.89 kg/m        PHYSICAL EXAM:     General: Patient appears well, nontoxic, comfortable  HEENT: " Moist mucous membranes, no tongue swelling.  No head trauma.  No midline neck pain.  Cardiovascular: Normal rate, normal rhythm, no extremity edema.  No appreciable murmur.  Respiratory: No signs of respiratory distress, lungs are clear to auscultation bilaterally with no wheezes rhonchi or rales.  Abdominal: Soft, nontender, nondistended, no palpable masses, no guarding, no rebound  Musculoskeletal: Full range of motion of joints, no deformities appreciated.  Neurological: Alert and oriented, grossly neurologically intact.  Psychological: Normal affect and mood.  Integument: No rashes appreciated          RESULTS       Labs Ordered and Resulted from Time of ED Arrival to Time of ED Departure   CBC WITH PLATELETS - Abnormal       Result Value    WBC Count 13.1 (*)     RBC Count 5.10      Hemoglobin 14.7      Hematocrit 44.1      MCV 87      MCH 28.8      MCHC 33.3      RDW 12.9      Platelet Count 373     BASIC METABOLIC PANEL - Abnormal    Sodium 141      Potassium 4.4      Chloride 107      Carbon Dioxide (CO2) 26      Anion Gap 8      Urea Nitrogen 17      Creatinine 0.69      Calcium 10.7 (*)     Glucose 130 (*)     GFR Estimate >90     HEPATIC FUNCTION PANEL - Normal    Bilirubin Total 0.5      Bilirubin Direct 0.2      Protein Total 7.6      Albumin 4.5      Alkaline Phosphatase 73      AST 25      ALT 29     LIPASE - Normal    Lipase <9     INR   PARTIAL THROMBOPLASTIN TIME   LIPASE       CTA Abdomen Pelvis with Contrast    (Results Pending)         PROCEDURES:  Procedures:  Procedures       I, Kerrie Garcia am serving as a scribe to document services personally performed by Lorenzo Méndez DO, based on my observations and the provider's statements to me.  I, Lorenzo Méndez DO, attest that Kerrie Garcia is acting in a scribe capacity, has observed my performance of the services and has documented them in accordance with my direction.    Lorenzo Méndez DO  Emergency Medicine  [facility] EMERGENCY  DEPARTMENT     Ohl, Lorenzo Campos, DO  04/12/22 [id number]

## 2022-04-12 NOTE — ED TRIAGE NOTES
"At 0300 in the morning, patient had an episode of abdominal pain. Reports \"muddy looking diarrhea, then it turned red\".  No further stooling since. Continued abdominal pian, comes in waves. Takes BP pills, no thinners.   "

## 2022-04-12 NOTE — TELEPHONE ENCOUNTER
"Patient calling with severe abdominal pain.     Woke up last night with a severe stomachache. Got up to use the bathroom and couldn't go at first, but then had a bout of diarrhea that she described as black in color. She says she was on the toilet at about 0200 and the pain was so excruciating that she \"passed out\" and hit her head. Has a bump there, but otherwise no other visible bleeding.  is there with her and can be heard talking in the background.    Currently has a headache. Rates abdominal pain 8/10. Just had a BM this morning that she says was \"red\" in color. Denies any vomiting. Says she was sweating when on the toilet and passed out earlier this morning.    Protocol recommends calling 911/going to ED now. Since patient passed out about 8 hours ago and is alert and awake currently,  will drive her to the ER. Plan to go to [place] or [facility]. Care advice reviewed.    Shala Barton RN, BSN  [facility]   Triage Nurse Advisor    COVID 19 Nurse Triage Plan/Patient Instructions    Please be aware that novel coronavirus (COVID-19) may be circulating in the community. If you develop symptoms such as fever, cough, or SOB or if you have concerns about the presence of another infection including coronavirus (COVID-19), please contact your health care provider or visit [email or web address].     Disposition/Instructions    Call to EMS/911 recommended. Follow protocol based instructions.     Bring Your Own Device:  Please also bring your smart device(s) (smart phones, tablets, laptops) and their charging cables for your personal use and to communicate with your care team during your visit.    Thank you for taking steps to prevent the spread of this virus.  o Limit your contact with others.  o Wear a simple mask to cover your cough.  o Wash your hands well and often.    Resources    [facility]: About COVID-19: [email or web address]    CDC: What to Do If You're Sick: " www.cdc.gov/coronavirus/2019-ncov/about/steps-when-sick.html    CDC: Ending Home Isolation: www.cdc.gov/coronavirus/2019-ncov/hcp/disposition-in-home-patients.html     CDC: Caring for Someone: www.cdc.gov/coronavirus/2019-ncov/if-you-are-sick/care-for-someone.html     [facility]: Interim Guidance for Hospital Discharge to Home: www.health.[facility]./diseases/coronavirus/hcp/hospdischarge.pdf    [facility] clinical trials (COVID-19 research studies): [email or web address]-[email or web address]-trials     Below are the COVID-19 hotlines at the [place] Department [facility] ([facility]). Interpreters are available.   o For health questions: Call 160-472-1117 or 1-775.413.8005 (7 a.m. to 7 p.m.)  o For questions about schools and childcare: Call 361-896-2578 or 1-578.786.3729 (7 a.m. to 7 p.m.)                     Reason for Disposition    Passed out (i.e., fainted, collapsed and was not responding)     Passed out 8 hours ago    SEVERE abdominal pain (e.g., excruciating)    Bloody, black, or tarry bowel movements (Exception: chronic-unchanged black-grey bowel movements and is taking iron pills or Pepto-bismol)    Additional Information    Negative: Abdominal pain and pregnant > 20 weeks    Negative: Abdominal pain and pregnant < 20 weeks    Negative: Shock suspected (e.g., cold/pale/clammy skin, too weak to stand, low BP, rapid pulse)    Negative: Sounds like a life-threatening emergency to the triager    Negative: Chest pain    Negative: Pain is mainly in upper abdomen (if needed ask: 'is it mainly above the belly button?')    Negative: Vomiting red blood or black (coffee ground) material    Protocols used: ABDOMINAL PAIN - FEMALE-A-[place]

## 2022-04-13 ENCOUNTER — MYC MEDICAL ADVICE (OUTPATIENT)
Dept: FAMILY MEDICINE | Facility: CLINIC | Age: 62
End: 2022-04-13
Payer: COMMERCIAL

## 2022-04-15 ENCOUNTER — TELEPHONE (OUTPATIENT)
Dept: FAMILY MEDICINE | Facility: CLINIC | Age: 62
End: 2022-04-15
Payer: COMMERCIAL

## 2022-04-15 NOTE — TELEPHONE ENCOUNTER
Given her CT scan, Gen Surgery is more appropriate than GI. I called and discussed with patient. She will call Dr. Page's office and schedule.

## 2022-04-15 NOTE — TELEPHONE ENCOUNTER
Patient called back, was told that  is general surgery and bariatric. Patient would like a referral to a GI doctor. Please respond to [employer] message.

## 2022-04-19 ENCOUNTER — TRANSFERRED RECORDS (OUTPATIENT)
Dept: HEALTH INFORMATION MANAGEMENT | Facility: CLINIC | Age: 62
End: 2022-04-19
Payer: COMMERCIAL

## 2022-06-03 DIAGNOSIS — E78.5 HYPERLIPIDEMIA, UNSPECIFIED HYPERLIPIDEMIA TYPE: ICD-10-CM

## 2022-06-03 DIAGNOSIS — I10 ESSENTIAL HYPERTENSION: ICD-10-CM

## 2022-06-04 RX ORDER — SIMVASTATIN 40 MG
TABLET ORAL
Qty: 90 TABLET | Refills: 0 | Status: SHIPPED | OUTPATIENT
Start: 2022-06-04 | End: 2022-09-01

## 2022-06-05 NOTE — TELEPHONE ENCOUNTER
"Last Written Prescription Date:  3/7/22  Last Fill Quantity: 90,  # refills: 1   Last office visit provider:  7/28/21     Requested Prescriptions   Pending Prescriptions Disp Refills     losartan (COZAAR) 100 MG tablet [Pharmacy Med Name: LOSARTAN 100MG TABLETS] 90 tablet 3     Sig: TAKE 1 TABLET(100 MG) BY MOUTH DAILY       Angiotensin-II Receptors Failed - 6/3/2022  5:37 AM        Failed - Last blood pressure under 140/90 in past 12 months     BP Readings from Last 3 Encounters:   04/12/22 (!) 148/81   08/23/21 (!) 168/90   07/28/21 133/88                 Passed - Recent (12 mo) or future (30 days) visit within the authorizing provider's specialty     Patient has had an office visit with the authorizing provider or a provider within the authorizing providers department within the previous 12 mos or has a future within next 30 days. See \"Patient Info\" tab in inbasket, or \"Choose Columns\" in Meds & Orders section of the refill encounter.              Passed - Medication is active on med list        Passed - Patient is age 18 or older        Passed - No active pregnancy on record        Passed - Normal serum creatinine on file in past 12 months     Recent Labs   Lab Test 04/12/22  1137   CR 0.69       Ok to refill medication if creatinine is low          Passed - Normal serum potassium on file in past 12 months     Recent Labs   Lab Test 04/12/22  1137   POTASSIUM 4.4                    Passed - No positive pregnancy test in past 12 months           simvastatin (ZOCOR) 40 MG tablet [Pharmacy Med Name: SIMVASTATIN 40MG TABLETS] 90 tablet 1     Sig: TAKE 1 TABLET(40 MG) BY MOUTH AT BEDTIME       Statins Protocol Passed - 6/3/2022  5:37 AM        Passed - LDL on file in past 12 months     Recent Labs   Lab Test 07/28/21  1051                Passed - No abnormal creatine kinase in past 12 months     No lab results found.             Passed - Recent (12 mo) or future (30 days) visit within the authorizing " "provider's specialty     Patient has had an office visit with the authorizing provider or a provider within the authorizing providers department within the previous 12 mos or has a future within next 30 days. See \"Patient Info\" tab in inbasket, or \"Choose Columns\" in Meds & Orders section of the refill encounter.              Passed - Medication is active on med list        Passed - Patient is age 18 or older        Passed - No active pregnancy on record        Passed - No positive pregnancy test in past 12 months             Sarai Nelson RN 06/04/22 8:00 PM  "

## 2022-06-05 NOTE — TELEPHONE ENCOUNTER
"Routing refill request to provider for review/approval because:  bp out of range    Last Written Prescription Date:  6/25/21  Last Fill Quantity: 90,  # refills: 3   Last office visit provider:  7/28/21     Requested Prescriptions   Pending Prescriptions Disp Refills     losartan (COZAAR) 100 MG tablet [Pharmacy Med Name: LOSARTAN 100MG TABLETS] 90 tablet 3     Sig: TAKE 1 TABLET(100 MG) BY MOUTH DAILY       Angiotensin-II Receptors Failed - 6/3/2022  5:37 AM        Failed - Last blood pressure under 140/90 in past 12 months     BP Readings from Last 3 Encounters:   04/12/22 (!) 148/81   08/23/21 (!) 168/90   07/28/21 133/88                 Passed - Recent (12 mo) or future (30 days) visit within the authorizing provider's specialty     Patient has had an office visit with the authorizing provider or a provider within the authorizing providers department within the previous 12 mos or has a future within next 30 days. See \"Patient Info\" tab in inbasket, or \"Choose Columns\" in Meds & Orders section of the refill encounter.              Passed - Medication is active on med list        Passed - Patient is age 18 or older        Passed - No active pregnancy on record        Passed - Normal serum creatinine on file in past 12 months     Recent Labs   Lab Test 04/12/22  1137   CR 0.69       Ok to refill medication if creatinine is low          Passed - Normal serum potassium on file in past 12 months     Recent Labs   Lab Test 04/12/22  1137   POTASSIUM 4.4                    Passed - No positive pregnancy test in past 12 months         Signed Prescriptions Disp Refills    simvastatin (ZOCOR) 40 MG tablet 90 tablet 0     Sig: TAKE 1 TABLET(40 MG) BY MOUTH AT BEDTIME       Statins Protocol Passed - 6/3/2022  5:37 AM        Passed - LDL on file in past 12 months     Recent Labs   Lab Test 07/28/21  1051                Passed - No abnormal creatine kinase in past 12 months     No lab results found.             Passed - " "Recent (12 mo) or future (30 days) visit within the authorizing provider's specialty     Patient has had an office visit with the authorizing provider or a provider within the authorizing providers department within the previous 12 mos or has a future within next 30 days. See \"Patient Info\" tab in inbasket, or \"Choose Columns\" in Meds & Orders section of the refill encounter.              Passed - Medication is active on med list        Passed - Patient is age 18 or older        Passed - No active pregnancy on record        Passed - No positive pregnancy test in past 12 months             Sarai Nelson, RN 06/04/22 8:01 PM  "

## 2022-06-06 RX ORDER — LOSARTAN POTASSIUM 100 MG/1
TABLET ORAL
Qty: 90 TABLET | Refills: 3 | Status: SHIPPED | OUTPATIENT
Start: 2022-06-06 | End: 2023-05-29

## 2022-07-20 ENCOUNTER — TRANSFERRED RECORDS (OUTPATIENT)
Dept: HEALTH INFORMATION MANAGEMENT | Facility: CLINIC | Age: 62
End: 2022-07-20

## 2022-07-29 ASSESSMENT — ENCOUNTER SYMPTOMS
HEADACHES: 0
HEARTBURN: 0
ARTHRALGIAS: 0
CONSTIPATION: 0
PALPITATIONS: 0
COUGH: 0
FREQUENCY: 0
SHORTNESS OF BREATH: 0
SORE THROAT: 0
DIARRHEA: 0
CHILLS: 0
NERVOUS/ANXIOUS: 0
DIZZINESS: 0
ABDOMINAL PAIN: 0
JOINT SWELLING: 0
WEAKNESS: 0
NAUSEA: 0
FEVER: 0
HEMATOCHEZIA: 0
MYALGIAS: 0
DYSURIA: 0
HEMATURIA: 0
EYE PAIN: 0
BREAST MASS: 0
PARESTHESIAS: 0

## 2022-08-01 ENCOUNTER — OFFICE VISIT (OUTPATIENT)
Dept: FAMILY MEDICINE | Facility: CLINIC | Age: 62
End: 2022-08-01
Payer: COMMERCIAL

## 2022-08-01 VITALS
HEART RATE: 60 BPM | TEMPERATURE: 97.8 F | SYSTOLIC BLOOD PRESSURE: 146 MMHG | BODY MASS INDEX: 28.06 KG/M2 | RESPIRATION RATE: 12 BRPM | HEIGHT: 62 IN | WEIGHT: 152.5 LBS | DIASTOLIC BLOOD PRESSURE: 80 MMHG

## 2022-08-01 DIAGNOSIS — Z00.00 ROUTINE GENERAL MEDICAL EXAMINATION AT A HEALTH CARE FACILITY: Primary | ICD-10-CM

## 2022-08-01 DIAGNOSIS — Z13.1 DIABETES MELLITUS SCREENING: ICD-10-CM

## 2022-08-01 DIAGNOSIS — Z23 NEED FOR VACCINATION: ICD-10-CM

## 2022-08-01 DIAGNOSIS — I10 ESSENTIAL HYPERTENSION: ICD-10-CM

## 2022-08-01 DIAGNOSIS — E78.5 HYPERLIPIDEMIA, UNSPECIFIED HYPERLIPIDEMIA TYPE: ICD-10-CM

## 2022-08-01 LAB
ALT SERPL W P-5'-P-CCNC: 30 U/L (ref 10–35)
ANION GAP SERPL CALCULATED.3IONS-SCNC: 11 MMOL/L (ref 7–15)
BUN SERPL-MCNC: 12.6 MG/DL (ref 8–23)
CALCIUM SERPL-MCNC: 9.4 MG/DL (ref 8.8–10.2)
CHLORIDE SERPL-SCNC: 106 MMOL/L (ref 98–107)
CHOLEST SERPL-MCNC: 220 MG/DL
CREAT SERPL-MCNC: 0.71 MG/DL (ref 0.51–0.95)
DEPRECATED HCO3 PLAS-SCNC: 26 MMOL/L (ref 22–29)
GFR SERPL CREATININE-BSD FRML MDRD: >90 ML/MIN/1.73M2
GLUCOSE SERPL-MCNC: 96 MG/DL (ref 70–99)
HDLC SERPL-MCNC: 58 MG/DL
LDLC SERPL CALC-MCNC: 111 MG/DL
NONHDLC SERPL-MCNC: 162 MG/DL
POTASSIUM SERPL-SCNC: 4.5 MMOL/L (ref 3.4–5.3)
SODIUM SERPL-SCNC: 143 MMOL/L (ref 136–145)
TRIGL SERPL-MCNC: 255 MG/DL

## 2022-08-01 PROCEDURE — 0064A COVID-19,PF,MODERNA (18+ YRS BOOSTER .25ML): CPT | Performed by: FAMILY MEDICINE

## 2022-08-01 PROCEDURE — 80061 LIPID PANEL: CPT | Performed by: FAMILY MEDICINE

## 2022-08-01 PROCEDURE — 91306 COVID-19,PF,MODERNA (18+ YRS BOOSTER .25ML): CPT | Performed by: FAMILY MEDICINE

## 2022-08-01 PROCEDURE — 80048 BASIC METABOLIC PNL TOTAL CA: CPT | Performed by: FAMILY MEDICINE

## 2022-08-01 PROCEDURE — 84460 ALANINE AMINO (ALT) (SGPT): CPT | Performed by: FAMILY MEDICINE

## 2022-08-01 PROCEDURE — 99396 PREV VISIT EST AGE 40-64: CPT | Mod: 25 | Performed by: FAMILY MEDICINE

## 2022-08-01 PROCEDURE — 36415 COLL VENOUS BLD VENIPUNCTURE: CPT | Performed by: FAMILY MEDICINE

## 2022-08-01 ASSESSMENT — ENCOUNTER SYMPTOMS
DYSURIA: 0
ABDOMINAL PAIN: 0
COUGH: 0
HEARTBURN: 0
PALPITATIONS: 0
CONSTIPATION: 0
FREQUENCY: 0
NAUSEA: 0
EYE PAIN: 0
FEVER: 0
HEADACHES: 0
NERVOUS/ANXIOUS: 0
CHILLS: 0
JOINT SWELLING: 0
BREAST MASS: 0
PARESTHESIAS: 0
DIARRHEA: 0
WEAKNESS: 0
HEMATOCHEZIA: 0
ARTHRALGIAS: 0
DIZZINESS: 0
SHORTNESS OF BREATH: 0
MYALGIAS: 0
HEMATURIA: 0
SORE THROAT: 0

## 2022-08-01 NOTE — ASSESSMENT & PLAN NOTE
Tolerating Zocor 40 mg very well.  We will recheck levels and adjust as necessary.  Of note if she does need greater than 40 mg we will need to move over to Lipitor or Crestor.

## 2022-08-01 NOTE — ASSESSMENT & PLAN NOTE
Not fully controlled.  Discussed options of implementing DASH diet, routine exercise, or change in medication or any combination of the 3.  Patient would like to be more aggressive with the diet, as such DASH diet information was given in AVS.  Also the routine exercise portion.  We will keep at current losartan 100 mg daily.  Nurse visit in 3 months for recheck.  If not below goal of 140/90 will introduce low-dose amlodipine at 2.5 mg.  Of note patient has a sulfa allergy and thus not a candidate for hydrochlorothiazide.

## 2022-08-01 NOTE — PROGRESS NOTES
SUBJECTIVE:   CC: Ashleigh Chirinos is an 62 year old woman who presents for preventive health visit.       Patient has been advised of split billing requirements and indicates understanding: Yes     Denies any chest pain, shortness of breath, dyspnea exertion, palpitations, nausea or vomiting.  Denies any changes in vision or hearing, or urinary or bowel habits.      Healthy Habits:     Getting at least 3 servings of Calcium per day:  Yes    Bi-annual eye exam:  Yes    Dental care twice a year:  Yes    Sleep apnea or symptoms of sleep apnea:  Daytime drowsiness and Excessive snoring    Diet:  Regular (no restrictions), Low salt and Low fat/cholesterol    Frequency of exercise:  2-3 days/week    Duration of exercise:  15-30 minutes    Taking medications regularly:  Yes    Barriers to taking medications:  None    Medication side effects:  None    PHQ-2 Total Score: 0    Additional concerns today:  No            Today's PHQ-2 Score:   PHQ-2 ( 1999 [employer]) 7/29/2022   Q1: Little interest or pleasure in doing things 0   Q2: Feeling down, depressed or hopeless 0   PHQ-2 Score 0   PHQ-2 Total Score (12-17 Years)- Positive if 3 or more points; Administer PHQ-A if positive -   Q1: Little interest or pleasure in doing things Not at all   Q2: Feeling down, depressed or hopeless Not at all   PHQ-2 Score 0       Abuse: Current or Past (Physical, Sexual or Emotional) - No  Do you feel safe in your environment? Yes        Social History     Tobacco Use     Smoking status: Former Smoker     Packs/day: 1.00     Years: 15.00     Pack years: 15.00     Types: Cigarettes     Smokeless tobacco: Never Used   Substance Use Topics     Alcohol use: Yes     Alcohol/week: 5.0 standard drinks     Types: 5 Cans of beer per week       Alcohol Use 7/29/2022   Prescreen: >3 drinks/day or >7 drinks/week? Yes   AUDIT SCORE  5     Reviewed orders with patient.  Reviewed health maintenance and updated orders accordingly - Yes  Lab work is in  "process    Breast Cancer Screening:    Breast CA Risk Assessment (FHS-7) 7/29/2022   Do you have a family history of breast, colon, or ovarian cancer? No / Unknown       Mammogram Screening: Recommended mammography every 1-2 years with patient discussion and risk factor consideration  Pertinent mammograms are reviewed under the imaging tab.    History of abnormal Pap smear: Status post benign hysterectomy. Health Maintenance and Surgical History updated.     Reviewed and updated as needed this visit by clinical staff   Tobacco  Allergies  Meds  Problems  Med Hx  Surg Hx  Fam Hx  Soc   Hx          Reviewed and updated as needed this visit by Provider   Tobacco  Allergies  Meds  Problems  Med Hx  Surg Hx  Fam Hx             Review of Systems   Constitutional: Negative for chills and fever.   HENT: Negative for congestion, ear pain, hearing loss and sore throat.    Eyes: Negative for pain and visual disturbance.   Respiratory: Negative for cough and shortness of breath.    Cardiovascular: Negative for chest pain, palpitations and peripheral edema.   Gastrointestinal: Negative for abdominal pain, constipation, diarrhea, heartburn, hematochezia and nausea.   Breasts:  Negative for tenderness, breast mass and discharge.   Genitourinary: Negative for dysuria, frequency, genital sores, hematuria, pelvic pain, urgency, vaginal bleeding and vaginal discharge.   Musculoskeletal: Negative for arthralgias, joint swelling and myalgias.   Skin: Negative for rash.   Neurological: Negative for dizziness, weakness, headaches and paresthesias.   Psychiatric/Behavioral: Negative for mood changes. The patient is not nervous/anxious.         OBJECTIVE:   BP (!) 146/80 (BP Location: Left arm, Patient Position: Sitting, Cuff Size: Adult Large)   Pulse 60   Temp 97.8  F (36.6  C) (Oral)   Resp 12   Ht 1.575 m (5' 2\")   Wt 69.2 kg (152 lb 8 oz)   LMP  (LMP Unknown)   Breastfeeding No   BMI 27.89 kg/m    Physical " Exam  Vitals and nursing note reviewed.   Constitutional:       General: She is not in acute distress.     Appearance: Normal appearance.   HENT:      Head: Normocephalic and atraumatic.      Right Ear: Tympanic membrane, ear canal and external ear normal.      Left Ear: Tympanic membrane, ear canal and external ear normal.      Nose: Nose normal.      Mouth/Throat:      Mouth: Mucous membranes are moist.      Pharynx: Oropharynx is clear. No oropharyngeal exudate.   Eyes:      General: No scleral icterus.     Extraocular Movements: Extraocular movements intact.      Conjunctiva/sclera: Conjunctivae normal.   Neck:      Vascular: No carotid bruit.   Cardiovascular:      Rate and Rhythm: Normal rate and regular rhythm.      Pulses: Normal pulses.      Heart sounds: Normal heart sounds. No murmur heard.    No friction rub. No gallop.   Pulmonary:      Effort: Pulmonary effort is normal.      Breath sounds: Normal breath sounds. No wheezing, rhonchi or rales.   Musculoskeletal:         General: No swelling. Normal range of motion.      Cervical back: Normal range of motion.      Right lower leg: No edema.      Left lower leg: No edema.   Skin:     General: Skin is warm and dry.      Capillary Refill: Capillary refill takes less than 2 seconds.      Findings: No rash.   Neurological:      General: No focal deficit present.      Mental Status: She is alert and oriented to person, place, and time.      Cranial Nerves: No cranial nerve deficit.      Gait: Gait is intact. Gait normal.      Deep Tendon Reflexes: Reflexes normal.      Reflex Scores:       Bicep reflexes are 2+ on the right side and 2+ on the left side.       Patellar reflexes are 2+ on the right side and 2+ on the left side.  Psychiatric:         Mood and Affect: Mood normal.         Thought Content: Thought content normal.             ASSESSMENT/PLAN:     Problem List Items Addressed This Visit        Medium    Essential hypertension     Not fully  "controlled.  Discussed options of implementing DASH diet, routine exercise, or change in medication or any combination of the 3.  Patient would like to be more aggressive with the diet, as such DASH diet information was given in AVS.  Also the routine exercise portion.  We will keep at current losartan 100 mg daily.  Nurse visit in 3 months for recheck.  If not below goal of 140/90 will introduce low-dose amlodipine at 2.5 mg.  Of note patient has a sulfa allergy and thus not a candidate for hydrochlorothiazide.           Hyperlipidemia     Tolerating Zocor 40 mg very well.  We will recheck levels and adjust as necessary.  Of note if she does need greater than 40 mg we will need to move over to Lipitor or Crestor.           Relevant Orders    Lipid panel reflex to direct LDL Fasting    ALT      Other Visit Diagnoses     Routine general medical examination at a health care facility    -  Primary    Need for vaccination        As per ACIP guidelines/recommendations    Relevant Orders    COVID-19,PF,MODERNA (18+ YRS BOOSTER .25ML) (Completed)    Basic metabolic panel    Diabetes mellitus screening        Relevant Orders    Basic metabolic panel          Patient has been advised of split billing requirements and indicates understanding: Yes    COUNSELING:  Reviewed preventive health counseling, as reflected in patient instructions       Regular exercise       Healthy diet/nutrition       Colorectal Cancer Screening       Advance Care Planning    Estimated body mass index is 27.89 kg/m  as calculated from the following:    Height as of this encounter: 1.575 m (5' 2\").    Weight as of this encounter: 69.2 kg (152 lb 8 oz).    Weight management plan: Discussed healthy diet and exercise guidelines    She reports that she has quit smoking. Her smoking use included cigarettes. She has a 15.00 pack-year smoking history. She has never used smokeless tobacco.      Counseling Resources:  ATP IV Guidelines  Pooled Cohorts Equation " Calculator  Breast Cancer Risk Calculator  BRCA-Related Cancer Risk Assessment: FHS-7 Tool  FRAX Risk Assessment  ICSI Preventive Guidelines  Dietary Guidelines for Americans, 2010  USDA's MyPlate  ASA Prophylaxis  Lung CA Screening    Porter Morales DO  [facility]

## 2022-08-01 NOTE — PATIENT INSTRUCTIONS
Dietary Approaches to Stop Hypertension (The DASH Diet)   What is hypertension?   Hypertension is the term for blood pressure that is consistently higher than normal. Blood pressure is the force of blood against artery walls. Blood pressure can be unhealthy if it is above 120/80. The higher your blood pressure, the greater the health risk.     High blood pressure can be controlled if you take these steps:   Maintain a healthy weight.   Be physically active.   Follow a healthy eating plan, which includes foods lower in salt and sodium.   If you drink alcoholic beverages, do so in moderation.   As noted in this list, diet affects high blood pressure. Following the DASH diet and reducing the amount of sodium in your diet will help lower your blood pressure. It will also help prevent high blood pressure.     What is the DASH diet?   Dietary Approaches to Stop Hypertension (DASH) is a diet that is low in saturated fat, cholesterol, and total fat. It emphasizes fruits, vegetables, and low-fat dairy foods. The DASH diet also includes whole-grain products, fish, poultry, and nuts. It encourages fewer servings of red meat, sweets, and sugar-containing beverages. It is rich in magnesium, potassium, and calcium, as well as protein and fiber.     How do I get started on the DASH diet?   The DASH diet requires no special foods and has no hard-to-follow recipes. Start by seeing how DASH compares with your current eating habits.  The DASH eating plan shown is based on 2,000 calories a day. Your health care provider or a dietitian can help you determine how many calories a day you need. Most adults need somewhere between 1600 and 2800 calories a day. Serving sizes will vary between 1/2 cup and 1 1/4 cups.     Check the product's nutrition label to determine serving sizes of particular products.    Food Group   Number of Servings   Examples of Serving Size    Grains and grain products   7 to 8   1 slice of bread    1 cup  ready-to-eat cold cereal    1/2 cup cooked rice, pasta, or   cereal    Vegetables   4 to 5   1 cup raw leafy vegetable    1/2 cup cooked vegetable    6 oz. vegetable juice    Fruits   4 to 5   1 medium fruit       1/4 cup dried fruit    1/2 cup fresh, frozen, or canned fruit    6 oz fruit juice    Low-fat or fat-free dairy foods   2 to 3   8 oz milk    1 cup yogurt    1 1/2 oz cheese    Lean meats, poultry, or fish   2 or fewer   3 oz cooked lean meat, skinless poultry, or fish    Nuts, seeds, and dry beans   4 to 5 per week   1/3 cup or 1 1/2 oz nuts    1 tablespoon or 1/2 oz seeds    1/2 cup cooked dry beans     Fats and oils   2 to 3   1 teaspoon soft margarine    1 tablespoon low-fat mayonnaise    2 tablespoons light salad dressing    1 teaspoon vegetable oil   Sweets   5 per week   1 tablespoon sugar              8 oz lemonade    1 tablespoon jelly or jam     1/2 oz jelly beans     Make changes gradually. Here are some suggestions that might help:     If you now eat 1 or 2 servings of vegetables a day, add a serving at lunch and another at dinner.   If you don't eat fruit now or have only juice at breakfast, add a serving to your meals or have it as a snack.   Drink milk or water with lunch or dinner instead of soda, sugar-sweetened tea, or alcohol. Choose low-fat (1%) or fat-free (skim) dairy products to reduce how much saturated fat, total fat, cholesterol, and calories you eat. If you have trouble digesting dairy products, try taking lactase enzyme pills or drops (available at drugstores and groceries) with the dairy foods. Or buy lactose-free milk or milk with lactase enzyme added to it.   Read food labels on margarines and salad dressings to choose products lowest in fat.   If you now eat large portions of meat, cut back gradually--by a half or a third at each meal. Limit meat to 6 ounces a day (2 servings). Three to four ounces is about the size of a deck of cards.   Have 2 or more vegetarian-style  (meatless) meals each week. Increase servings of vegetables, rice, pasta, and beans in all meals. Try casseroles and pasta, and stir-braga dishes, which have less meat and more vegetables, grains, and beans.   Use fruits canned in their own juice. Fresh fruits require little or no preparation. Dried fruits are a good choice to carry with you or to have ready in the car.   Try these snacks ideas: unsalted pretzels or nuts mixed with raisins, mayra crackers, low-fat and fat-free yogurt and frozen yogurt, popcorn with no salt or butter added, and raw vegetables.   Choose whole grain foods to get more nutrients, including minerals and fiber. For example, choose whole-wheat bread or whole-grain cereals.   Use fresh, frozen, or no-salt-added canned vegetables.     Remember to also reduce the salt and sodium in your diet. Try to have no more than 2000 milligrams (mg) of sodium per day, with a goal of further reducing the sodium to 1500 mg per day. Three important ways to reduce sodium are:     Use reduced-sodium or no-salt-added food products.   Use less salt when you prepare foods and do not add salt to your food at the table.   Read fool labels. Aim for foods that are less than 5 percent of the daily value of sodium.     The DASH eating plan was not designed for weight loss. But it contains many lower calorie foods, such as fruits and vegetables. You can make it lower in calories by replacing higher calorie foods with more fruits and vegetables. Some ideas to increase fruits and vegetables and decrease calories include:     Eat a medium apple instead of four shortbread cookies. You'll save 80 calories.   Eat 1/4 cup of dried apricots instead of a 2-ounce bag of pork rinds. You'll save 230 calories.   Have a hamburger that's 3 ounces instead of 6 ounces. Add a 1/2 cup serving of carrots and a 1/2 cup serving of spinach. You'll save more than 200 calories.   Instead of 5 ounces of chicken, have a stir braga with 2 ounces of  chicken and 1 and 1/2 cups of raw vegetables. Use a small amount of vegetable oil. You'll save 50 calories.   Have a 1/2 cup serving of low-fat frozen yogurt instead of a 1 and 1/2 ounce milk chocolate bar. You'll save about 110 calories.   Use low-fat or fat-free condiments, such as fat free salad dressings.   Eat smaller portions--cut back gradually.   Use food labels to compare fat content in packaged foods. Items marked low-fat or fat-free may be lower in fat without being lower in calories than their regular versions.   Limit foods with lots of added sugar, such as pies, flavored yogurts, candy bars, ice cream, sherbet, regular soft drinks, and fruit drinks.   Drink water or club soda instead of cola or other soda drinks.     Based on National Institutes of Health Guidelines. Published by [employer].   Copyright   2004 [employer] and/or one of its subsidiaries. All Rights Reserved.     Preventive Health Recommendations  Female Ages 50 - 64    Yearly exam: See your health care provider every year in order to  Review health changes.   Discuss preventive care.    Review your medicines if your doctor has prescribed any.    Get a Pap test every three years (unless you have an abnormal result and your provider advises testing more often).  If you get Pap tests with HPV test, you only need to test every 5 years, unless you have an abnormal result.   You do not need a Pap test if your uterus was removed (hysterectomy) and you have not had cancer.  You should be tested each year for STDs (sexually transmitted diseases) if you're at risk.   Have a mammogram every 1 to 2 years.  Have a colonoscopy at age 50, or have a yearly FIT test (stool test). These exams screen for colon cancer.    Have a cholesterol test every 5 years, or more often if advised.  Have a diabetes test (fasting glucose) every three years. If you are at risk for diabetes, you should have this test more often.   If you are at  risk for osteoporosis (brittle bone disease), think about having a bone density scan (DEXA).    Shots: Get a flu shot each year. Get a tetanus shot every 10 years.    Nutrition:   Eat at least 5 servings of fruits and vegetables each day.  Eat whole-grain bread, whole-wheat pasta and brown rice instead of white grains and rice.  Get adequate Calcium and Vitamin D.     Lifestyle  Exercise at least 150 minutes a week (30 minutes a day, 5 days a week). This will help you control your weight and prevent disease.  Limit alcohol to one drink per day.  No smoking.   Wear sunscreen to prevent skin cancer.   See your dentist every six months for an exam and cleaning.  See your eye doctor every 1 to 2 years.

## 2022-09-26 ENCOUNTER — ANCILLARY PROCEDURE (OUTPATIENT)
Dept: MAMMOGRAPHY | Facility: CLINIC | Age: 62
End: 2022-09-26
Attending: FAMILY MEDICINE
Payer: COMMERCIAL

## 2022-09-26 DIAGNOSIS — Z12.31 VISIT FOR SCREENING MAMMOGRAM: ICD-10-CM

## 2022-09-26 PROCEDURE — 77067 SCR MAMMO BI INCL CAD: CPT

## 2022-10-02 ENCOUNTER — HEALTH MAINTENANCE LETTER (OUTPATIENT)
Age: 62
End: 2022-10-02

## 2022-10-26 ENCOUNTER — TRANSFERRED RECORDS (OUTPATIENT)
Dept: HEALTH INFORMATION MANAGEMENT | Facility: CLINIC | Age: 62
End: 2022-10-26

## 2022-11-03 ENCOUNTER — ALLIED HEALTH/NURSE VISIT (OUTPATIENT)
Dept: FAMILY MEDICINE | Facility: CLINIC | Age: 62
End: 2022-11-03
Attending: FAMILY MEDICINE
Payer: COMMERCIAL

## 2022-11-03 VITALS — HEART RATE: 60 BPM | SYSTOLIC BLOOD PRESSURE: 132 MMHG | OXYGEN SATURATION: 96 % | DIASTOLIC BLOOD PRESSURE: 84 MMHG

## 2022-11-03 DIAGNOSIS — I10 ESSENTIAL HYPERTENSION: Primary | ICD-10-CM

## 2022-11-03 PROCEDURE — 99207 PR NO CHARGE NURSE ONLY: CPT

## 2022-11-03 PROCEDURE — 90471 IMMUNIZATION ADMIN: CPT

## 2022-11-03 PROCEDURE — 90682 RIV4 VACC RECOMBINANT DNA IM: CPT

## 2022-11-03 NOTE — PROGRESS NOTES
I met with Ashleigh Chirinos at the request of Dr. Morales to recheck her blood pressure.  Blood pressure medications on the med list were reviewed with patient.    Patient has taken all medications as per usual regimen: Yes  Patient reports tolerating them without any issues or concerns: Yes    Vitals:    11/03/22 0914   BP: 132/84   BP Location: Left arm   Patient Position: Sitting   Cuff Size: Adult Large   Pulse: 60   SpO2: 96%       Blood pressure was taken, previous encounter was reviewed, recorded blood pressure below 140/90.  Patient was discharged and the note will be sent to the provider for final review.     Ishan Bhatt RN  [facility]

## 2023-05-29 DIAGNOSIS — I10 ESSENTIAL HYPERTENSION: ICD-10-CM

## 2023-05-29 RX ORDER — LOSARTAN POTASSIUM 100 MG/1
TABLET ORAL
Qty: 90 TABLET | Refills: 0 | Status: SHIPPED | OUTPATIENT
Start: 2023-05-29 | End: 2023-08-28

## 2023-05-29 NOTE — TELEPHONE ENCOUNTER
"Last Written Prescription Date:  6/6/22  Last Fill Quantity: 90,  # refills: 3   Last office visit provider:  8/1/22     Requested Prescriptions   Pending Prescriptions Disp Refills     losartan (COZAAR) 100 MG tablet [Pharmacy Med Name: LOSARTAN 100MG TABLETS] 90 tablet 3     Sig: TAKE 1 TABLET(100 MG) BY MOUTH DAILY       Angiotensin-II Receptors Passed - 5/29/2023  5:28 AM        Passed - Last blood pressure under 140/90 in past 12 months     BP Readings from Last 3 Encounters:   11/03/22 132/84   08/01/22 (!) 146/80   04/12/22 (!) 148/81                 Passed - Recent (12 mo) or future (30 days) visit within the authorizing provider's specialty     Patient has had an office visit with the authorizing provider or a provider within the authorizing providers department within the previous 12 mos or has a future within next 30 days. See \"Patient Info\" tab in inbasket, or \"Choose Columns\" in Meds & Orders section of the refill encounter.              Passed - Medication is active on med list        Passed - Patient is age 18 or older        Passed - No active pregnancy on record        Passed - Normal serum creatinine on file in past 12 months     Recent Labs   Lab Test 08/01/22  0933   CR 0.71       Ok to refill medication if creatinine is low          Passed - Normal serum potassium on file in past 12 months     Recent Labs   Lab Test 08/01/22  0933   POTASSIUM 4.5                    Passed - No positive pregnancy test in past 12 months             TRAVIS ALONZO RN 05/29/23 1:19 PM  "

## 2023-08-27 DIAGNOSIS — I10 ESSENTIAL HYPERTENSION: ICD-10-CM

## 2023-08-27 DIAGNOSIS — E78.5 HYPERLIPIDEMIA, UNSPECIFIED HYPERLIPIDEMIA TYPE: ICD-10-CM

## 2023-08-27 NOTE — TELEPHONE ENCOUNTER
"SIMVASTATIN 40MG TABLETS  Routing refill request to provider for review/approval because:  Labs not current:  LDL  Patient needs to be seen because it has been more than 1 year since last office visit.  Last Written Prescription Date:  09/01/22  Last Fill Quantity: 90,  # refills: 3   Last office visit provider:  8/01/22 w/ Dr Morales     LOSARTAN 100MG TABLETS  Routing refill request to provider for review/approval because:  Labs not current:  creatinine, potassium  Patient needs to be seen because it has been more than 1 year since last office visit.  Last Written Prescription Date:  5/29/23  Last Fill Quantity: 90,  # refills: 0     Requested Prescriptions   Pending Prescriptions Disp Refills    simvastatin (ZOCOR) 40 MG tablet [Pharmacy Med Name: SIMVASTATIN 40MG TABLETS] 90 tablet 3     Sig: TAKE 1 TABLET(40 MG) BY MOUTH AT BEDTIME       Statins Protocol Failed - 8/27/2023  5:29 AM        Failed - LDL on file in past 12 months     Recent Labs   Lab Test 08/01/22  0933   *             Failed - Recent (12 mo) or future (30 days) visit within the authorizing provider's specialty     Patient has had an office visit with the authorizing provider or a provider within the authorizing providers department within the previous 12 mos or has a future within next 30 days. See \"Patient Info\" tab in inbasket, or \"Choose Columns\" in Meds & Orders section of the refill encounter.              Passed - No abnormal creatine kinase in past 12 months     No lab results found.             Passed - Medication is active on med list        Passed - Patient is age 18 or older        Passed - No active pregnancy on record        Passed - No positive pregnancy test in past 12 months          losartan (COZAAR) 100 MG tablet [Pharmacy Med Name: LOSARTAN 100MG TABLETS] 90 tablet 0     Sig: TAKE 1 TABLET(100 MG) BY MOUTH DAILY       Angiotensin-II Receptors Failed - 8/27/2023  5:29 AM        Failed - Recent (12 mo) or future (30 days) " "visit within the authorizing provider's specialty     Patient has had an office visit with the authorizing provider or a provider within the authorizing providers department within the previous 12 mos or has a future within next 30 days. See \"Patient Info\" tab in inbasket, or \"Choose Columns\" in Meds & Orders section of the refill encounter.              Failed - Normal serum creatinine on file in past 12 months     Recent Labs   Lab Test 08/01/22  0933   CR 0.71       Ok to refill medication if creatinine is low          Failed - Normal serum potassium on file in past 12 months     Recent Labs   Lab Test 08/01/22  0933   POTASSIUM 4.5                    Passed - Last blood pressure under 140/90 in past 12 months     BP Readings from Last 3 Encounters:   11/03/22 132/84   08/01/22 (!) 146/80   04/12/22 (!) 148/81                 Passed - Medication is active on med list        Passed - Patient is age 18 or older        Passed - No active pregnancy on record        Passed - No positive pregnancy test in past 12 months             Shala Barton RN 08/27/23 7:31 AM  "

## 2023-08-28 RX ORDER — SIMVASTATIN 40 MG
TABLET ORAL
Qty: 90 TABLET | Refills: 3 | Status: SHIPPED | OUTPATIENT
Start: 2023-08-28 | End: 2024-08-21

## 2023-08-28 RX ORDER — LOSARTAN POTASSIUM 100 MG/1
TABLET ORAL
Qty: 90 TABLET | Refills: 0 | Status: SHIPPED | OUTPATIENT
Start: 2023-08-28 | End: 2023-11-27

## 2023-10-21 ENCOUNTER — HEALTH MAINTENANCE LETTER (OUTPATIENT)
Age: 63
End: 2023-10-21

## 2023-10-30 ASSESSMENT — ENCOUNTER SYMPTOMS
PALPITATIONS: 0
WEAKNESS: 0
MYALGIAS: 0
NERVOUS/ANXIOUS: 0
FREQUENCY: 0
EYE PAIN: 0
DIARRHEA: 0
CONSTIPATION: 0
HEMATOCHEZIA: 0
FEVER: 0
DIZZINESS: 0
CHILLS: 0
ARTHRALGIAS: 0
SHORTNESS OF BREATH: 0
DYSURIA: 0
SORE THROAT: 0
COUGH: 0
HEMATURIA: 0
HEADACHES: 0
ABDOMINAL PAIN: 0
JOINT SWELLING: 0
NAUSEA: 0
BREAST MASS: 0
PARESTHESIAS: 0
HEARTBURN: 0

## 2023-10-30 ASSESSMENT — ACTIVITIES OF DAILY LIVING (ADL): CURRENT_FUNCTION: NO ASSISTANCE NEEDED

## 2023-11-03 ENCOUNTER — ANCILLARY PROCEDURE (OUTPATIENT)
Dept: MAMMOGRAPHY | Facility: CLINIC | Age: 63
End: 2023-11-03
Attending: FAMILY MEDICINE
Payer: MEDICARE

## 2023-11-03 DIAGNOSIS — Z12.31 SCREENING MAMMOGRAM, ENCOUNTER FOR: ICD-10-CM

## 2023-11-03 PROCEDURE — 77067 SCR MAMMO BI INCL CAD: CPT

## 2023-11-06 ENCOUNTER — OFFICE VISIT (OUTPATIENT)
Dept: FAMILY MEDICINE | Facility: CLINIC | Age: 63
End: 2023-11-06
Attending: FAMILY MEDICINE
Payer: MEDICARE

## 2023-11-06 VITALS
BODY MASS INDEX: 27.82 KG/M2 | DIASTOLIC BLOOD PRESSURE: 82 MMHG | OXYGEN SATURATION: 97 % | HEART RATE: 65 BPM | WEIGHT: 151.2 LBS | SYSTOLIC BLOOD PRESSURE: 138 MMHG | TEMPERATURE: 97.8 F | HEIGHT: 62 IN | RESPIRATION RATE: 12 BRPM

## 2023-11-06 DIAGNOSIS — E78.5 HYPERLIPIDEMIA, UNSPECIFIED HYPERLIPIDEMIA TYPE: ICD-10-CM

## 2023-11-06 DIAGNOSIS — I10 ESSENTIAL HYPERTENSION: ICD-10-CM

## 2023-11-06 DIAGNOSIS — Z78.0 MENOPAUSE: ICD-10-CM

## 2023-11-06 DIAGNOSIS — Z00.00 ENCOUNTER FOR MEDICARE ANNUAL WELLNESS EXAM: Primary | ICD-10-CM

## 2023-11-06 DIAGNOSIS — H10.45 CHRONIC ALLERGIC CONJUNCTIVITIS: ICD-10-CM

## 2023-11-06 DIAGNOSIS — Z23 NEED FOR VACCINATION: ICD-10-CM

## 2023-11-06 PROBLEM — L42 PITYRIASIS ROSEA: Status: RESOLVED | Noted: 2020-01-29 | Resolved: 2023-11-06

## 2023-11-06 PROCEDURE — 90677 PCV20 VACCINE IM: CPT | Performed by: FAMILY MEDICINE

## 2023-11-06 PROCEDURE — 90662 IIV NO PRSV INCREASED AG IM: CPT | Performed by: FAMILY MEDICINE

## 2023-11-06 PROCEDURE — 84460 ALANINE AMINO (ALT) (SGPT): CPT | Performed by: FAMILY MEDICINE

## 2023-11-06 PROCEDURE — 90480 ADMN SARSCOV2 VAC 1/ONLY CMP: CPT | Performed by: FAMILY MEDICINE

## 2023-11-06 PROCEDURE — 91320 SARSCV2 VAC 30MCG TRS-SUC IM: CPT | Performed by: FAMILY MEDICINE

## 2023-11-06 PROCEDURE — 80061 LIPID PANEL: CPT | Performed by: FAMILY MEDICINE

## 2023-11-06 PROCEDURE — G0009 ADMIN PNEUMOCOCCAL VACCINE: HCPCS | Performed by: FAMILY MEDICINE

## 2023-11-06 PROCEDURE — 36415 COLL VENOUS BLD VENIPUNCTURE: CPT | Performed by: FAMILY MEDICINE

## 2023-11-06 PROCEDURE — G0008 ADMIN INFLUENZA VIRUS VAC: HCPCS | Performed by: FAMILY MEDICINE

## 2023-11-06 PROCEDURE — G0402 INITIAL PREVENTIVE EXAM: HCPCS | Performed by: FAMILY MEDICINE

## 2023-11-06 PROCEDURE — 80048 BASIC METABOLIC PNL TOTAL CA: CPT | Performed by: FAMILY MEDICINE

## 2023-11-06 PROCEDURE — 99214 OFFICE O/P EST MOD 30 MIN: CPT | Mod: 25 | Performed by: FAMILY MEDICINE

## 2023-11-06 ASSESSMENT — ENCOUNTER SYMPTOMS
NERVOUS/ANXIOUS: 0
COUGH: 0
DIZZINESS: 0
HEADACHES: 0
CHILLS: 0
BREAST MASS: 0
PALPITATIONS: 0
EYE PAIN: 0
SHORTNESS OF BREATH: 0
DIARRHEA: 0
ABDOMINAL PAIN: 0
MYALGIAS: 0
NAUSEA: 0
DYSURIA: 0
SORE THROAT: 0
PARESTHESIAS: 0
HEMATURIA: 0
JOINT SWELLING: 0
FEVER: 0
WEAKNESS: 0
ARTHRALGIAS: 0
CONSTIPATION: 0
HEARTBURN: 0
HEMATOCHEZIA: 0
FREQUENCY: 0

## 2023-11-06 ASSESSMENT — ACTIVITIES OF DAILY LIVING (ADL): CURRENT_FUNCTION: NO ASSISTANCE NEEDED

## 2023-11-06 NOTE — PROGRESS NOTES
"SUBJECTIVE:   Ashleigh is a 63 year old who presents for Preventive Visit.      11/6/2023    11:49 AM   Additional Questions   Roomed by CRISTINO Alanis   Accompanied by Self         11/6/2023    11:49 AM   Patient Reported Additional Medications   Patient reports taking the following new medications N/A       Are you in the first 12 months of your Medicare coverage?  Yes,  Visual Acuity:  Right Eye: 20/20   Left Eye: 20/20  Both Eyes: 20/20    Denies any chest pain, shortness of breath, dyspnea exertion, palpitations, nausea or vomiting.  Denies any changes in vision or hearing, or urinary or bowel habits.       Healthy Habits:     In general, how would you rate your overall health?  Good    Frequency of exercise:  2-3 days/week    Duration of exercise:  15-30 minutes    Do you usually eat at least 4 servings of fruit and vegetables a day, include whole grains    & fiber and avoid regularly eating high fat or \"junk\" foods?  Yes    Taking medications regularly:  Yes    Barriers to taking medications:  None    Medication side effects:  None    Ability to successfully perform activities of daily living:  No assistance needed    Home Safety:  No safety concerns identified    Hearing Impairment:  No hearing concerns    In the past 6 months, have you been bothered by leaking of urine?  No    In general, how would you rate your overall mental or emotional health?  Good    Additional concerns today:  No      Today's PHQ-2 Score:       11/6/2023    10:50 AM   PHQ-2 ( 1999 [employer])   Q1: Little interest or pleasure in doing things 0   Q2: Feeling down, depressed or hopeless 0   PHQ-2 Score 0   Q1: Little interest or pleasure in doing things Not at all   Q2: Feeling down, depressed or hopeless Not at all   PHQ-2 Score 0       Have you ever done Advance Care Planning? (For example, a Health Directive, POLST, or a discussion with a medical provider or your loved ones about your wishes): No, advance care planning information given to " patient to review.  Patient plans to discuss their wishes with loved ones or provider.         Fall risk  Fallen 2 or more times in the past year?: No  Any fall with injury in the past year?: Yes  Timed Up and Go Test (>13.5 is fall risk; contact physician) : 9    Cognitive Screening   1) Repeat 3 items (Leader, Season, Table)    2) Clock draw: NORMAL  3) 3 item recall: Recalls 3 objects  Results: 3 items recalled: COGNITIVE IMPAIRMENT LESS LIKELY    Mini-CogTM Copyright VENESSA Martinez. Licensed by the author for use in [facility]; reprinted with permission ([email or web address]). All rights reserved.        Reviewed and updated as needed this visit by clinical staff   Tobacco  Allergies  Meds  Problems  Med Hx  Surg Hx  Fam Hx  Soc   Hx        Reviewed and updated as needed this visit by Provider   Tobacco  Allergies  Meds  Problems  Med Hx  Surg Hx  Fam Hx         Social History     Tobacco Use    Smoking status: Former     Packs/day: 1.00     Years: 15.00     Additional pack years: 0.00     Total pack years: 15.00     Types: Cigarettes     Passive exposure: Past    Smokeless tobacco: Never   Substance Use Topics    Alcohol use: Yes     Alcohol/week: 5.0 standard drinks of alcohol     Types: 5 Cans of beer per week             10/30/2023    11:06 AM   Alcohol Use   Prescreen: >3 drinks/day or >7 drinks/week? No     Do you have a current opioid prescription? No  Do you use any other controlled substances or medications that are not prescribed by a provider? None    Current providers sharing in care for this patient include:   Patient Care Team:  Porter Morales DO as PCP - General (Family Practice)  Porter Morales DO as Assigned PCP    The following health maintenance items are reviewed in Epic and correct as of today:  Health Maintenance   Topic Date Due    DEXA  Never done    ALT  08/01/2023    BMP  08/01/2023    LIPID  08/01/2023    INFLUENZA VACCINE (1) 09/01/2023    COVID-19 Vaccine  (5 - 2023-24 season) 09/01/2023    Pneumococcal Vaccine: 65+ Years (1 - PCV) 06/13/2023    RSV VACCINE (Pregnancy & 60+) (1 - 1-dose 60+ series) 12/06/2023 (Originally 6/13/2018)    MAMMO SCREENING  11/03/2024    MEDICARE ANNUAL WELLNESS VISIT  11/06/2024    ANNUAL REVIEW OF HM ORDERS  11/06/2024    FALL RISK ASSESSMENT  11/06/2024    COLORECTAL CANCER SCREENING  07/20/2025    ADVANCE CARE PLANNING  07/27/2025    DTAP/TDAP/TD IMMUNIZATION (2 - Td or Tdap) 12/29/2025    HEPATITIS C SCREENING  Completed    HIV SCREENING  Completed    PHQ-2 (once per calendar year)  Completed    ZOSTER IMMUNIZATION  Completed    IPV IMMUNIZATION  Aged Out    HPV IMMUNIZATION  Aged Out    MENINGITIS IMMUNIZATION  Aged Out    RSV MONOCLONAL ANTIBODY  Aged Out    LUNG CANCER SCREENING  Discontinued     Lab work is in process  Labs reviewed in [facility]        7/29/2022     4:17 PM   Breast CA Risk Assessment (FHS-7)   Do you have a family history of breast, colon, or ovarian cancer? No / Unknown       Mammogram Screening: Recommended mammography every 1-2 years with patient discussion and risk factor consideration  Pertinent mammograms are reviewed under the imaging tab.    Review of Systems   Constitutional:  Negative for chills and fever.   HENT:  Negative for congestion, ear pain, hearing loss and sore throat.    Eyes:  Negative for pain and visual disturbance.   Respiratory:  Negative for cough and shortness of breath.    Cardiovascular:  Negative for chest pain, palpitations and peripheral edema.   Gastrointestinal:  Negative for abdominal pain, constipation, diarrhea, heartburn, hematochezia and nausea.   Breasts:  Negative for tenderness, breast mass and discharge.   Genitourinary:  Negative for dysuria, frequency, genital sores, hematuria, pelvic pain, urgency, vaginal bleeding and vaginal discharge.   Musculoskeletal:  Negative for arthralgias, joint swelling and myalgias.   Skin:  Negative for rash.   Neurological:  Negative for  "dizziness, weakness, headaches and paresthesias.   Psychiatric/Behavioral:  Negative for mood changes. The patient is not nervous/anxious.        OBJECTIVE:   /82   Pulse 65   Temp 97.8  F (36.6  C) (Oral)   Resp 12   Ht 1.575 m (5' 2\")   Wt 68.6 kg (151 lb 3.2 oz)   LMP  (LMP Unknown)   SpO2 97%   Breastfeeding No   BMI 27.65 kg/m   Estimated body mass index is 27.65 kg/m  as calculated from the following:    Height as of this encounter: 1.575 m (5' 2\").    Weight as of this encounter: 68.6 kg (151 lb 3.2 oz).  Physical Exam  Vitals and nursing note reviewed.   Constitutional:       General: She is not in acute distress.     Appearance: Normal appearance.   HENT:      Head: Normocephalic and atraumatic.      Right Ear: Tympanic membrane, ear canal and external ear normal.      Left Ear: Tympanic membrane, ear canal and external ear normal.      Nose: Nose normal.      Mouth/Throat:      Mouth: Mucous membranes are moist.      Pharynx: Oropharynx is clear. No oropharyngeal exudate.   Eyes:      General: No scleral icterus.     Extraocular Movements: Extraocular movements intact.      Conjunctiva/sclera: Conjunctivae normal.   Neck:      Vascular: No carotid bruit.   Cardiovascular:      Rate and Rhythm: Normal rate and regular rhythm.      Pulses: Normal pulses.      Heart sounds: Normal heart sounds. No murmur heard.     No friction rub. No gallop.   Pulmonary:      Effort: Pulmonary effort is normal.      Breath sounds: Normal breath sounds. No wheezing, rhonchi or rales.   Musculoskeletal:         General: No swelling. Normal range of motion.      Cervical back: Normal range of motion.      Right lower leg: No edema.      Left lower leg: No edema.   Skin:     General: Skin is warm and dry.      Capillary Refill: Capillary refill takes less than 2 seconds.      Findings: No rash.   Neurological:      General: No focal deficit present.      Mental Status: She is alert and oriented to person, place, " "and time.      Cranial Nerves: No cranial nerve deficit.      Gait: Gait is intact. Gait normal.      Deep Tendon Reflexes: Reflexes normal.      Reflex Scores:       Bicep reflexes are 2+ on the right side and 2+ on the left side.       Patellar reflexes are 2+ on the right side and 2+ on the left side.  Psychiatric:         Mood and Affect: Mood normal.         Thought Content: Thought content normal.         ASSESSMENT / PLAN:     Problem List Items Addressed This Visit       Chronic allergic conjunctivitis     Controlled well with the use of Zyrtec daily.  Encouraged to continue.         Essential hypertension     Below goal 140/90 on current regimen milligrams daily.  Continue current medication.  Diet and exercise encouraged         Relevant Orders    BASIC METABOLIC PANEL    Hyperlipidemia     Doing well on current statin.  We will recheck levels and adjust as needed.         Relevant Orders    ALT    Lipid panel reflex to direct LDL Fasting     Other Visit Diagnoses       Encounter for Medicare annual wellness exam    -  Primary    Relevant Orders    PRIMARY CARE FOLLOW-UP SCHEDULING    Need for vaccination        Relevant Orders    INFLUENZA VACCINE 65+ (FLUZONE HD) (Completed)    COVID-19 12+ (2023-24) (PFIZER) (Completed)    Pneumococcal 20 Valent Conjugate (PCV20) (Completed)    Menopause        Relevant Orders    DEXA HIP/PELVIS/SPINE - Future            Patient has been advised of split billing requirements and indicates understanding: Yes      COUNSELING:  Reviewed preventive health counseling, as reflected in patient instructions       Regular exercise       Healthy diet/nutrition       Vision screening       Dental care       Bladder control       Fall risk prevention       Osteoporosis prevention/bone health       Advanced Planning       BMI:   Estimated body mass index is 27.65 kg/m  as calculated from the following:    Height as of this encounter: 1.575 m (5' 2\").    Weight as of this encounter: " 68.6 kg (151 lb 3.2 oz).   Weight management plan: Discussed healthy diet and exercise guidelines      She reports that she has quit smoking. Her smoking use included cigarettes. She has a 15.00 pack-year smoking history. She has been exposed to tobacco smoke. She has never used smokeless tobacco.      Appropriate preventive services were discussed with this patient, including applicable screening as appropriate for fall prevention, nutrition, physical activity, Tobacco-use cessation, weight loss and cognition.  Checklist reviewing preventive services available has been given to the patient.    Reviewed patients plan of care and provided an AVS. The Basic Care Plan (routine screening as documented in Health Maintenance) for Ashleigh meets the Care Plan requirement. This Care Plan has been established and reviewed with the Patient.          Porter Morales [facility]    Identified Health Risks:  I have reviewed Opioid Use Disorder and Substance Use Disorder risk factors and made any needed referrals.

## 2023-11-06 NOTE — PATIENT INSTRUCTIONS
Patient Education   Personalized Prevention Plan  You are due for the preventive services outlined below.  Your care team is available to assist you in scheduling these services.  If you have already completed any of these items, please share that information with your care team to update in your medical record.  Health Maintenance Due   Topic Date Due     Osteoporosis Screening  Never done     Liver Monitoring Lab  08/01/2023     Basic Metabolic Panel  08/01/2023     Cholesterol Lab  08/01/2023     ANNUAL REVIEW OF HM ORDERS  08/01/2023     Flu Vaccine (1) 09/01/2023     COVID-19 Vaccine (5 - 2023-24 season) 09/01/2023     Pneumococcal Vaccine (1 - PCV) 06/13/2023

## 2023-11-07 LAB
ALT SERPL W P-5'-P-CCNC: 37 U/L (ref 0–50)
ANION GAP SERPL CALCULATED.3IONS-SCNC: 12 MMOL/L (ref 7–15)
BUN SERPL-MCNC: 13.6 MG/DL (ref 8–23)
CALCIUM SERPL-MCNC: 9.3 MG/DL (ref 8.8–10.2)
CHLORIDE SERPL-SCNC: 107 MMOL/L (ref 98–107)
CHOLEST SERPL-MCNC: 184 MG/DL
CREAT SERPL-MCNC: 0.7 MG/DL (ref 0.51–0.95)
DEPRECATED HCO3 PLAS-SCNC: 24 MMOL/L (ref 22–29)
EGFRCR SERPLBLD CKD-EPI 2021: >90 ML/MIN/1.73M2
GLUCOSE SERPL-MCNC: 91 MG/DL (ref 70–99)
HDLC SERPL-MCNC: 63 MG/DL
LDLC SERPL CALC-MCNC: 93 MG/DL
NONHDLC SERPL-MCNC: 121 MG/DL
POTASSIUM SERPL-SCNC: 4.3 MMOL/L (ref 3.4–5.3)
SODIUM SERPL-SCNC: 143 MMOL/L (ref 135–145)
TRIGL SERPL-MCNC: 139 MG/DL

## 2023-11-07 NOTE — ASSESSMENT & PLAN NOTE
Below goal 140/90 on current regimen milligrams daily.  Continue current medication.  Diet and exercise encouraged

## 2023-11-25 DIAGNOSIS — I10 ESSENTIAL HYPERTENSION: ICD-10-CM

## 2023-11-27 RX ORDER — LOSARTAN POTASSIUM 100 MG/1
TABLET ORAL
Qty: 90 TABLET | Refills: 2 | Status: SHIPPED | OUTPATIENT
Start: 2023-11-27 | End: 2024-08-21

## 2023-12-13 ENCOUNTER — HOSPITAL ENCOUNTER (OUTPATIENT)
Dept: BONE DENSITY | Facility: HOSPITAL | Age: 63
Discharge: HOME | End: 2023-12-13
Attending: FAMILY MEDICINE | Admitting: FAMILY MEDICINE
Payer: MEDICARE

## 2023-12-13 DIAGNOSIS — Z78.0 MENOPAUSE: ICD-10-CM

## 2023-12-13 PROCEDURE — 77080 DXA BONE DENSITY AXIAL: CPT

## 2024-01-17 ENCOUNTER — MYC MEDICAL ADVICE (OUTPATIENT)
Dept: FAMILY MEDICINE | Facility: CLINIC | Age: 64
End: 2024-01-17
Payer: MEDICARE

## 2024-01-17 DIAGNOSIS — R06.83 LOUD SNORING: Primary | ICD-10-CM

## 2024-02-02 ENCOUNTER — VIRTUAL VISIT (OUTPATIENT)
Dept: INTERNAL MEDICINE | Facility: CLINIC | Age: 64
End: 2024-02-02
Payer: MEDICARE

## 2024-02-02 ENCOUNTER — ANCILLARY PROCEDURE (OUTPATIENT)
Dept: GENERAL RADIOLOGY | Facility: CLINIC | Age: 64
End: 2024-02-02
Attending: INTERNAL MEDICINE
Payer: MEDICARE

## 2024-02-02 DIAGNOSIS — R05.3 PERSISTENT COUGH FOR 3 WEEKS OR LONGER: Primary | ICD-10-CM

## 2024-02-02 DIAGNOSIS — R05.3 PERSISTENT COUGH FOR 3 WEEKS OR LONGER: ICD-10-CM

## 2024-02-02 PROCEDURE — 99441 PR PHYSICIAN TELEPHONE EVALUATION 5-10 MIN: CPT | Mod: 93 | Performed by: INTERNAL MEDICINE

## 2024-02-02 PROCEDURE — 71046 X-RAY EXAM CHEST 2 VIEWS: CPT | Mod: TC | Performed by: RADIOLOGY

## 2024-02-02 NOTE — PROGRESS NOTES
Ashleigh is a 63 year old who is being evaluated via a billable telephone visit.      What phone number would you like to be contacted at? [phone]  How would you like to obtain your AVS? MyChart  Distant Location (provider location):  Off-site    Assessment & Plan   Persistent cough for 3 weeks or longer  DDX wide. She reports significant post-nasal drip that responded to antihistamines. Reports history of pneumonia. No known history of asthma but feels her chest is tight + wheezy. Treatments obviously vary pending etiology of symptoms. Discussed that a telephone visit is not a great way to evaluate this cough, and I recommended she be seen in-person for proper evaluation. She was interested in pursuing a CXR though so I have placed that order. I will follow-up with results on MyChart but again recommended she be evaluated in-person.  - XR Chest 2 Views; Future    F/u with regular PCP at regular interval or sooner REBECA Brannon MD, MPH  [facility]  Internal Medicine    Subjective   Ashleigh is a 63 year old who presents for a same day acute care virtual telephone visit with chief concern of:  Cough (Productive cough for a month )  This is the first time I have met Ashleigh, who typically gets care at clinics closer to her residence.    History of Present Illness       Reason for visit:  Chronic cough  Symptom onset:  3-4 weeks ago  Symptoms include:  Severe cough  Symptom intensity:  Severe  Symptom progression:  Improving  Had these symptoms before:  Yes  Has tried/received treatment for these symptoms:  Yes  Previous treatment was successful:  No    She eats 4 or more servings of fruits and vegetables daily.She consumes 0 sweetened beverage(s) daily.She exercises with enough effort to increase her heart rate 10 to 19 minutes per day.  She exercises with enough effort to increase her heart rate 3 or less days per week.   She is taking medications regularly.     Feels mucus  draining down the back of her throat. Persistent cough. Has tried Delsym without relief. Took 'allergy pills' and those helped, but then the effect wore off. Feels she has 'weak lungs'. Reports chest tightness, cough is worse at night. Laying down is not comfortable due to cough. Reports sick contacts 'but they had something else'. No f/c.        Objective    Vitals - Patient Reported  Weight (Patient Reported): 68 kg (150 lb)  Vitals: No vitals were obtained today due to virtual visit.    Physical Exam   General: Alert and no distress //Respiratory: No audible wheeze, cough, or shortness of breath // Psychiatric:  Appropriate affect, tone, and pace of words    Phone call duration: 8 minutes

## 2024-02-04 ENCOUNTER — OFFICE VISIT (OUTPATIENT)
Dept: FAMILY MEDICINE | Facility: CLINIC | Age: 64
End: 2024-02-04
Payer: MEDICARE

## 2024-02-04 VITALS
SYSTOLIC BLOOD PRESSURE: 153 MMHG | TEMPERATURE: 97.9 F | WEIGHT: 150 LBS | DIASTOLIC BLOOD PRESSURE: 84 MMHG | RESPIRATION RATE: 16 BRPM | BODY MASS INDEX: 27.6 KG/M2 | HEART RATE: 81 BPM | HEIGHT: 62 IN | OXYGEN SATURATION: 95 %

## 2024-02-04 DIAGNOSIS — R05.8 POST-VIRAL COUGH SYNDROME: Primary | ICD-10-CM

## 2024-02-04 PROCEDURE — 99214 OFFICE O/P EST MOD 30 MIN: CPT | Performed by: NURSE PRACTITIONER

## 2024-02-04 RX ORDER — PREDNISONE 20 MG/1
20 TABLET ORAL DAILY
Qty: 5 TABLET | Refills: 0 | Status: SHIPPED | OUTPATIENT
Start: 2024-02-04 | End: 2024-02-09

## 2024-02-04 NOTE — PROGRESS NOTES
"  Assessment & Plan     Post-viral cough syndrome  *  - predniSONE (DELTASONE) 20 MG tablet  Dispense: 5 tablet; Refill: 0    -Later symptoms are related to postviral cough syndrome.  This was discussed in detail today.  She can try low-dose prednisone, and Flonase nasal spray.  The nasal spray she can buy over-the-counter.  I discussed that she should use this medication every day, and it can take up to 2 to 5 days for it to fully kick in.  She can continue her antihistamine at night.   -Tolerated prednisone in the past.  Side effects were discussed.   -I reviewed chest x-ray results with patient today.  They are negative for pneumonia.  Lung sounds today are good.                   No follow-ups on file.    Subjective   Ashleigh is a 63 year old, presenting for the following health issues:  Cough (Has been coughing lately and now the coughing has caused patients eyes,nose and throat to produce a lot of fluid )    HPI     Started over 3 to 4 weeks ago; Feels mucus draining down the back of her throat. Persistent cough. Comes out of no-where. Some sneezing. Has tried Delsym without relief. Took 'allergy pills' and those helped, but then the effect wore off. Reports cough is worse at night. Laying down is not comfortable due to cough. Reports sick contacts 'but they had something else'. No f/c.  - draining in back of throat that is causing her to gag.   -No acid reflux symptoms.  No sore throat.  No fevers.  No wheezing.  No new medications.                      Objective    BP (!) 153/84   Pulse 81   Temp 97.9  F (36.6  C) (Oral)   Resp 16   Ht 1.575 m (5' 2\")   Wt 68 kg (150 lb)   LMP  (LMP Unknown)   SpO2 95%   BMI 27.44 kg/m    Body mass index is 27.44 kg/m .  Physical Exam   GENERAL: alert and no distress  EYES: Eyes grossly normal to inspection, PERRL and conjunctivae and sclerae normal  HENT: ear canals and TM's normal, nose and mouth without ulcers or lesions  NECK: no adenopathy, no asymmetry, masses, " or scars  RESP: lungs clear to auscultation - no rales, rhonchi or wheezes  CV: regular rate and rhythm, normal S1 S2, no S3 or S4, no murmur, click or rub, no peripheral edema  MS: no gross musculoskeletal defects noted, no edema  PSYCH: mentation appears normal, affect normal/bright    EXAM: XR CHEST 2 VIEWS  LOCATION: [facility]  DATE/TIME: 2/2/2024 4:16 PM CST     INDICATION: Persistent cough for 3 weeks or longer  COMPARISON: Frontal and lateral views of the chest 01/02/2018                                                                      IMPRESSION:      The cardiac silhouette is normal in size. Hilar and mediastinal interfaces are normal.     The lungs are symmetrically inflated. There are no airspace or interstitial opacities.     Diaphragm curvature is normal. No pleural fluid is present.     Small thoracic spine degenerative osteophytes are present.  No results found for any visits on 02/04/24.  No results found for this or any previous visit (from the past 24 hour(s)).        Signed Electronically by: MARY LOU Trujillo CNP

## 2024-04-16 ENCOUNTER — VIRTUAL VISIT (OUTPATIENT)
Dept: SLEEP MEDICINE | Facility: CLINIC | Age: 64
End: 2024-04-16
Attending: FAMILY MEDICINE
Payer: MEDICARE

## 2024-04-16 VITALS — WEIGHT: 150 LBS | BODY MASS INDEX: 27.6 KG/M2 | HEIGHT: 62 IN

## 2024-04-16 DIAGNOSIS — R06.83 LOUD SNORING: Primary | ICD-10-CM

## 2024-04-16 DIAGNOSIS — Z87.891 FORMER SMOKER: ICD-10-CM

## 2024-04-16 DIAGNOSIS — R45.1 MOTOR RESTLESSNESS: ICD-10-CM

## 2024-04-16 DIAGNOSIS — J30.1 SEASONAL ALLERGIC RHINITIS DUE TO POLLEN: ICD-10-CM

## 2024-04-16 DIAGNOSIS — R05.9 COUGH, UNSPECIFIED TYPE: ICD-10-CM

## 2024-04-16 DIAGNOSIS — G47.30 OBSERVED SLEEP APNEA: ICD-10-CM

## 2024-04-16 PROCEDURE — 99204 OFFICE O/P NEW MOD 45 MIN: CPT | Mod: 95 | Performed by: INTERNAL MEDICINE

## 2024-04-16 ASSESSMENT — SLEEP AND FATIGUE QUESTIONNAIRES
HOW LIKELY ARE YOU TO NOD OFF OR FALL ASLEEP WHILE SITTING AND TALKING TO SOMEONE: WOULD NEVER DOZE
HOW LIKELY ARE YOU TO NOD OFF OR FALL ASLEEP IN A CAR, WHILE STOPPED FOR A FEW MINUTES IN TRAFFIC: WOULD NEVER DOZE
HOW LIKELY ARE YOU TO NOD OFF OR FALL ASLEEP WHILE LYING DOWN TO REST IN THE AFTERNOON WHEN CIRCUMSTANCES PERMIT: SLIGHT CHANCE OF DOZING
HOW LIKELY ARE YOU TO NOD OFF OR FALL ASLEEP WHEN YOU ARE A PASSENGER IN A CAR FOR AN HOUR WITHOUT A BREAK: SLIGHT CHANCE OF DOZING
HOW LIKELY ARE YOU TO NOD OFF OR FALL ASLEEP WHILE SITTING INACTIVE IN A PUBLIC PLACE: WOULD NEVER DOZE
HOW LIKELY ARE YOU TO NOD OFF OR FALL ASLEEP WHILE WATCHING TV: WOULD NEVER DOZE
HOW LIKELY ARE YOU TO NOD OFF OR FALL ASLEEP WHILE SITTING QUIETLY AFTER LUNCH WITHOUT ALCOHOL: WOULD NEVER DOZE
HOW LIKELY ARE YOU TO NOD OFF OR FALL ASLEEP WHILE SITTING AND READING: WOULD NEVER DOZE

## 2024-04-16 ASSESSMENT — PAIN SCALES - GENERAL: PAINLEVEL: NO PAIN (0)

## 2024-04-16 NOTE — PATIENT INSTRUCTIONS
"          MY TREATMENT INFORMATION FOR SLEEP APNEA-  Ashleigh Chirinos    DOCTOR : Misty Fine MD    Am I having a home sleep study?  --->Watch the video for the device you are using:    -/drop off device-   https://www.SolarPrint.com/watch?v=yGGFBdELGhk      Frequently asked questions:  1. What is Obstructive Sleep Apnea (KENNETH)? KENNETH is the most common type of sleep apnea. Apnea means, \"without breath.\"  Apnea is most often caused by narrowing or collapse of the upper airway as muscles relax during sleep.   Almost everyone has occasional apneas. Most people with sleep apnea have had brief interruptions at night frequently for many years.  The severity of sleep apnea is related to how frequent and severe the events are.   2. What are the consequences of KENNETH? Symptoms include: feeling sleepy during the day, snoring loudly, gasping or stopping of breathing, trouble sleeping, and occasionally morning headaches or heartburn at night.  Sleepiness can be serious and even increase the risk of falling asleep while driving. Other health consequences may include development of high blood pressure and other cardiovascular disease in persons who are susceptible. Untreated KENNETH  can contribute to heart disease, stroke and diabetes.   3. What are the treatment options? In most situations, sleep apnea is a lifelong disease that must be managed with daily therapy. Medications are not effective for sleep apnea and surgery is generally not considered until other therapies have been tried. Your treatment is your choice . Continuous Positive Airway (CPAP) works right away and is the therapy that is effective in nearly everyone. An oral device to hold your jaw forward is usually the next most reliable option. Other options include postioning devices (to keep you off your back), weight loss, and surgery including a tongue pacing device. There is more detail about some of these options below.  4. Are my sleep studies covered by " insurance? Although we will request verification of coverage, we advise you also check in advance of the study to ensure there is coverage.    Important tips for those choosing CPAP and similar devices  REMEMBER-IF YOU RECEIVE A CALL FROM  [phone]-->IT IS TO SETUP A DEVICE  For new devices, sign up for device DAYAMI to monitor your device for your followup visits  We encourage you to utilize the [employer] dayami or website ( https://PeerReach.Pocket Concierge/ ) to monitor your therapy progress and share the data with your healthcare team when you discuss your sleep apnea.                                                    Know your equipment:  CPAP is continuous positive airway pressure that prevents obstructive sleep apnea by keeping the throat from collapsing while you are sleeping. In most cases, the device is  smart  and can slowly self-adjusts if your throat collapses and keeps a record every day of how well you are treated-this information is available to you and your care team.  BPAP is bilevel positive airway pressure that keeps your throat open and also assists each breath with a pressure boost to maintain adequate breathing.  Special kinds of BPAP are used in patients who have inadequate breathing from lung or heart disease. In most cases, the device is  smart  and can slowly self-adjusts to assist breathing. Like CPAP, the device keeps a record of how well you are treated.  Your mask is your connection to the device. You get to choose what feels most comfortable and the staff will help to make sure if fits. Here: are some examples of the different masks that are available: Magnetic mask aids may assist with use but there are safety issues that should be addressed when considering with magnets* ( see end of discussion).       Key points to remember on your journey with sleep apnea:  Sleep study.  PAP devices often need to be adjusted during a sleep study to show that they are effective and adjusted  right.  Good tips to remember: Try wearing just the mask during a quiet time during the day so your body adapts to wearing it. A humidifier is recommended for comfort in most cases to prevent drying of your nose and throat. Allergy medication from your provider may help you if you are having nasal congestion.  Getting settled-in. It takes more than one night for most of us to get used to wearing a mask. Try wearing just the mask during a quiet time during the day so your body adapts to wearing it. A humidifier is recommended for comfort in most cases. Our team will work with you carefully on the first day and will be in contact within 4 days and again at 2 and 4 weeks for advice and remote device adjustments. Your therapy is evaluated by the device each day.   Use it every night. The more you are able to sleep naturally for 7-8 hours, the more likely you will have good sleep and to prevent health risks or symptoms from sleep apnea. Even if you use it 4 hours it helps. Occasionally all of us are unable to use a medical therapy, in sleep apnea, it is not dangerous to miss one night.   Communicate. Call our skilled team on the number provided on the first day if your visit for problems that make it difficult to wear the device. Over 2 out of 3 patients can learn to wear the device long-term with help from our team. Remember to call our team or your sleep providers if you are unable to wear the device as we may have other solutions for those who cannot adapt to mask CPAP therapy. It is recommended that you sleep your sleep provider within the first 3 months and yearly after that if you are not having problems.   Use it for your health. We encourage use of CPAP masks during daytime quiet periods to allow your face and brain to adapt to the sensation of CPAP so that it will be a more natural sensation to awaken to at night or during naps. This can be very useful during the first few weeks or months of adapting to CPAP  though it does not help medically to wear CPAP during wakefulness and  should not be used as a strategy just to meet guidelines.  Take care of your equipment. Make sure you clean your mask and tubing using directions every day and that your filter and mask are replaced as recommended or if they are not working.     *Masks with magnets:  Updated Contraindications  Masks with magnetic components are contraindicated for use by patients where they, or anyone in close physical contact while using the mask, have the following:   Active medical implants that interact with magnets (i.e., pacemakers, implantable cardioverter defibrillators (ICD), neurostimulators, cerebrospinal fluid (CSF) shunts, insulin/infusion pumps)   Metallic implants/objects containing ferromagnetic material (i.e., aneurysm clips/flow disruption devices, embolic coils, stents, valves, electrodes, implants to restore hearing or balance with implanted magnets, ocular implants, metallic splinters in the eye)  Updated Warning  Keep the mask magnets at a safe distance of at least 6 inches (150 mm) away from implants or medical devices that may be adversely affected by magnetic interference. This warning applies to you or anyone in close physical contact with your mask. The magnets are in the frame and lower headgear clips, with a magnetic field strength of up to 400mT. When worn, they connect to secure the mask but may inadvertently detach while asleep.  Implants/medical devices, including those listed within contraindications, may be adversely affected if they change function under external magnetic fields or contain ferromagnetic materials that attract/repel to magnetic fields (some metallic implants, e.g., contact lenses with metal, dental implants, metallic cranial plates, screws, eleanor hole covers, and bone substitute devices). Consult your physician and  of your implant / other medical device for information on the potential adverse  effects of magnetic fields.    BESIDES CPAP, WHAT OTHER THERAPIES ARE THERE?    Positioning Device  Positioning devices are generally used when sleep apnea is mild and only occurs on your back.This example shows a pillow that straps around the waist. It may be appropriate for those whose sleep study shows milder sleep apnea that occurs primarily when lying flat on one's back. Preliminary studies have shown benefit but effectiveness at home may need to be verified by a home sleep test. These devices are generally not covered by medical insurance.  Examples of devices that maintain sleeping on the back to prevent snoring and mild sleep apnea.    Belt type body positioner  http://IDMission.soup.me/    Electronic reminder  http://nightshifttherapy.com/            Oral Appliance  What is oral appliance therapy?  An oral appliance device fits on your teeth at night like a retainer used after having braces. The device is made by a specialized dentist and requires several visits over 1-2 months before a manufactured device is made to fit your teeth and is adjusted to prevent your sleep apnea. Once an oral device is working properly, snoring should be improved. A home sleep test may be recommended at that time if to determine whether the sleep apnea is adequately treated.       Some things to remember:  -Oral devices are often, but not always, covered by your medical insurance. Be sure to check with your insurance provider.   -If you are referred for oral therapy, you will be given a list of specialized dentists to consider or you may choose to visit the Web site of the American Academy of Dental Sleep Medicine  -Oral devices are less likely to work if you have severe sleep apnea or are extremely overweight.     More detailed information  An oral appliance is a small acrylic device that fits over the upper and lower teeth  (similar to a retainer or a mouth guard). This device slightly moves jaw forward, which moves the base of  the tongue forward, opens the airway, improves breathing for effective treat snoring and obstructive sleep apnea in perhaps 7 out of 10 people .  The best working devices are custom-made by a dental device  after a mold is made of the teeth 1, 2, 3.  When is an oral appliance indicated?  Oral appliance therapy is recommended as a first-line treatment for patients with primary snoring, mild sleep apnea, and for patients with moderate sleep apnea who prefer appliance therapy to use of CPAP4, 5. Severity of sleep apnea is determined by sleep testing and is based on the number of respiratory events per hour of sleep.   How successful is oral appliance therapy?  The success rate of oral appliance therapy in patients with mild sleep apnea is 75-80% while in patients with moderate sleep apnea it is 50-70%. The chance of success in patients with severe sleep apnea is 40-50%. The research also shows that oral appliances have a beneficial effect on the cardiovascular health of KENNETH patients at the same magnitude as CPAP therapy7.  Oral appliances should be a second-line treatment in cases of severe sleep apnea, but if not completely successful then a combination therapy utilizing CPAP plus oral appliance therapy may be effective. Oral appliances tend to be effective in a broad range of patients although studies show that the patients who have the highest success are females, younger patients, those with milder disease, and less severe obesity. 3, 6.   Finding a dentist that practices dental sleep medicine  Specific training is available through the American Academy of Dental Sleep Medicine for dentists interested in working in the field of sleep. To find a dentist who is educated in the field of sleep and the use of oral appliances, near you, visit the Web site of the American Academy of Dental Sleep Medicine.    References  1. Cyn et al. Objectively measured vs self-reported compliance during oral  appliance therapy for sleep-disordered breathing. Chest 2013; 144(5): 2795-8628.  2. Mila, et al. Objective measurement of compliance during oral appliance therapy for sleep-disordered breathing. Thorax 2013; 68(1): 91-96.  3. Victorina et al. Mandibular advancement devices in 620 men and women with KENNETH and snoring: tolerability and predictors of treatment success. Chest 2004; 125: 9468-9951.  4. Mario, et al. Oral appliances for snoring and KENNETH: a review. Sleep 2006; 29: 244-262.  5. Gato et al. Oral appliance treatment for KENNETH: an update. J Clin Sleep Med 2014; 10(2): 215-227.  6. Ramesh et al. Predictors of OSAH treatment outcome. J Dent Res 2007; 86: 8154-1356.      Weight Loss:   Your Body mass index is 27.44 kg/m .    Being overweight does not necessarily mean you will have health consequences.  Those who have BMI over 35 or over 27 with existing medical conditions carries greater risk.   Weight loss decreases severity of sleep apnea in most people with obesity. For those with mild obesity who have developed snoring with weight gain, even 15-30 pound weight loss can improve and occasionally milder eliminate sleep apnea.  Structured and life-long dietary and health habits are necessary to lose weight and keep healthier weight levels.     The Comprehensive Weight loss program offers all aspects of weight loss strategies including two Non-Surgical Weight Loss Programs: Medical Weight Management and our 24 Week Healthy Lifestyle Program:    Medical Weight Management: You will meet with a Medical Weight Management Provider, as well as a Registered Dietician. The program may include medication therapy, dietary education, recommended exercise and physical therapy programs, monthly support group meetings, and possible psychological counseling. Follow up visits with the provider or dietician are scheduled based on your progress and needs.    24 Week Healthy Lifestyle Program: This unique program is  designed to give you the support of weekly appointments and activities thru a 24-week period. It may include all of the components of the basic program (above), with the addition of 11 individual Health  Visits, 24-week access to the [employer] website for over 700 online classes, and monthly support group meetings. This program has an out-of-pocket expense of $499 to cover the items that can not be billed to insurance (health coaches and [employer] access), and is non-refundable/non-transferable (you may be able to use a Health Savings Account; ask your HSA provider). There may be an optional meal replacement plan prescribed as well.   Surgical management achieves meaningful long-term weight loss and improvement in health risks in most patients with more severe obesity.      Sleep Apnea Surgery:    Surgery for obstructive sleep apnea is considered generally only when other therapies fail to work. Surgery may be discussed with you if you are having a difficult time tolerating CPAP and or when there is an abnormal structure that requires surgical correction.  Nose and throat surgeries often enlarge the airway to prevent collapse.  Most of these surgeries create pain for 1-2 weeks and up to half of the most common surgeries are not effective throughout life.  You should carefully discuss the benefits and drawbacks to surgery with your sleep provider and surgeon to determine if it is the best solution for you.   More information  Surgery for KENNETH is directed at areas that are responsible for narrowing or complete obstruction of the airway during sleep.  There are a wide range of procedures available to enlarge and/or stabilize the airway to prevent blockage of breathing in the three major areas where it can occur: the palate, tongue, and nasal regions.  Successful surgical treatment depends on the accurate identification of the factors responsible for obstructive sleep apnea in each person.  A personalized approach  is required because there is no single treatment that works well for everyone.  Because of anatomic variation, consultation with an examination by a sleep surgeon is a critical first step in determining what surgical options are best for each patient.  In some cases, examination during sedation may be recommended in order to guide the selection of procedures.  Patients will be counseled about risks and benefits as well as the typical recovery course after surgery. Surgery is typically not a cure for a person s KENNETH.  However, surgery will often significantly improve one s KENNETH severity (termed  success rate ).  Even in the absence of a cure, surgery will decrease the cardiovascular risk associated with OSA7; improve overall quality of life8 (sleepiness, functionality, sleep quality, etc).      Palate Procedures:  Patients with KENNETH often have narrowing of their airway in the region of their tonsils and uvula.  The goals of palate procedures are to widen the airway in this region as well as to help the tissues resist collapse.  Modern palate procedure techniques focus on tissue conservation and soft tissue rearrangement, rather than tissue removal.  Often the uvula is preserved in this procedure. Residual sleep apnea is common in patient after pharyngoplasty with an average reduction in sleep apnea events of 33%2.      Tongue Procedures:  ExamWhile patients are awake, the muscles that surround the throat are active and keep this region open for breathing. These muscles relax during sleep, allowing the tongue and other structures to collapse and block breathing.  There are several different tongue procedures available.  Selection of a tongue base procedure depends on characteristics seen on physical exam.  Generally, procedures are aimed at removing bulky tissues in this area or preventing the back of the tongue from falling back during sleep.  Success rates for tongue surgery range from 50-62%3.    Hypoglossal Nerve  Stimulation:  Hypoglossal nerve stimulation has recently received approval from the [place] Food and Drug Administration for the treatment of obstructive sleep apnea.  This is based on research showing that the system was safe and effective in treating sleep apnea6.  Results showed that the median AHI score decreased 68%, from 29.3 to 9.0. This therapy uses an implant system that senses breathing patterns and delivers mild stimulation to airway muscles, which keeps the airway open during sleep.  The system consists of three fully implanted components: a small generator (similar in size to a pacemaker), a breathing sensor, and a stimulation lead.  Using a small handheld remote, a patient turns the therapy on before bed and off upon awakening.    Candidates for this device must be greater than 18 years of age, have moderate to severe obstructive sleep apnea with less than 25% central events  (AHI between 15-65), BMI less than 35, have tried CPAP/oral appliance for at least 8 weeks without success, and have appropriate upper airway anatomy (determined by a sleep endoscopy performed by Dr. Barrera Balderas or Dr. Michael Lane).    Nasal Procedures:  Nasal obstruction can interfere with nasal breathing during the day and night.  Studies have shown that relief of nasal obstruction can improve the ability of some patients to tolerate positive airway pressure therapy for obstructive sleep apnea1.  Treatment options include medications such as nasal saline, topical corticosteroid and antihistamine sprays, and oral medications such as antihistamines or decongestants. Non-surgical treatments can include external nasal dilators for selected patients. If these are not successful by themselves, surgery can improve the nasal airway either alone or in combination with these other options.        Combination Procedures:  Combination of surgical procedures and other treatments may be recommended, particularly if patients have more  than one area of narrowing or persistent positional disease.  The success rate of combination surgery ranges from 66-80%2,3.    References  James GREGG. The Role of the Nose in Snoring and Obstructive Sleep Apnoea: An Update.  Eur Arch Otorhinolaryngol. 2011; 268: 1365-73.   Nadiya SM; Ana JA; Gisel JR; Pallanch JF; Edgar MB; Sheryl SG; Servando RODRIGES. Surgical modifications of the upper airway for obstructive sleep apnea in adults: a systematic review and meta-analysis. SLEEP 2010;33(10):9575-5688. Marlene HARO. Hypopharyngeal surgery in obstructive sleep apnea: an evidence-based medicine review.  Arch Otolaryngol Head Neck Surg. 2006 Feb;132(2):206-13.  Erlin YH1, Nile Y, Alex PAUL. The efficacy of anatomically based multilevel surgery for obstructive sleep apnea. Otolaryngol Head Neck Surg. 2003 Oct;129(4):327-35.  Kezirian E, Goldberg A. Hypopharyngeal Surgery in Obstructive Sleep Apnea: An Evidence-Based Medicine Review. Arch Otolaryngol Head Neck Surg. 2006 Feb;132(2):206-13.  Eder PJ et al. Upper-Airway Stimulation for Obstructive Sleep Apnea.  N Engl J Med. 2014 Jan 9;370(2):139-49.  Wenceslao Y et al. Increased Incidence of Cardiovascular Disease in Middle-aged Men with Obstructive Sleep Apnea. Am J Respir Crit Care Med; 2002 166: 159-165  Wall EM et al. Studying Life Effects and Effectiveness of Palatopharyngoplasty (SLEEP) study: Subjective Outcomes of Isolated Uvulopalatopharyngoplasty. Otolaryngol Head Neck Surg. 2011; 144: 623-631.        WHAT IF I ONLY HAVE SNORING?    Mandibular advancement devices, lateral sleep positioning, long-term weight loss and treatment of nasal allergies have been shown to improve snoring.  Exercising tongue muscles with a game (https://apps.ADTZ/us/dayami/soundly-reduce-snoring/ok1580414732) or stimulating the tongue during the day with a device (https://doi.org/10.3390/ejd94284226) have improved snoring in some  individuals.  https://www.True Fit.Akippa/  https://www.sleepfoundation.org/best-anti-snoring-mouthpieces-and-mouthguards    Remember to Drive Safe... Drive Alive     Sleep health profoundly affects your health, mood, and your safety.  Thirty three percent of the population (one in three of us) is not getting enough sleep and many have a sleep disorder. Not getting enough sleep or having an untreated / undertreated sleep condition may make us sleepy without even knowing it. In fact, our driving could be dramatically impaired due to our sleep health. As your provider, here are some things I would like you to know about driving:     Here are some warning signs for impairment and dangerous drowsy driving:              -Having been awake more than 16 hours               -Looking tired               -Eyelid drooping              -Head nodding (it could be too late at this point)              -Driving for more than 30 minutes     Some things you could do to make the driving safer if you are experiencing some drowsiness:              -Stop driving and rest              -Call for transportation              -Make sure your sleep disorder is adequately treated     Some things that have been shown NOT to work when experiencing drowsiness while driving:              -Turning on the radio              -Opening windows              -Eating any  distracting  /  entertaining  foods (e.g., sunflower seeds, candy, or any other)              -Talking on the phone      Your decision may not only impact your life, but also the life of others. Please, remember to drive safe for yourself and all of us.

## 2024-04-16 NOTE — LETTER
4/16/2024         RE: Ashleigh Chirinos  [address] [facility] [address]        Dear Colleague,    Thank you for referring your patient, Ashleigh Chirinos, to the [facility] [place]. Please see a copy of my visit note below.    Virtual Visit Details    Type of service:  Video Visit   Video Start Time: 10:07 AM  Video End Time:10:39 AM    Originating Location (pt. Location): Home    Distant Location (provider location):  Off-site  Platform used for Video Visit: [facility]    Chief complaint: Consultation requested by Porter Morales DO for evaluation of loud disruptive snoring, possible apnea    History of Present Illness: 63-year-old female with history of hypertension describes loud disruptive snoring.  She has been told by her  about this on multiple occasions.  He has also described that she will have pauses in the snoring followed by gasping or choking sounds.  She thinks this is worse on her back.  She needs to sleep on her back because of back issues.  She has had some weight gain.  She is not sure how long this has been going on.  She does feel tired during the day but does not take naps.  She is not dozing off unintentionally.  She typically will get into bed around 10 to 10:30 PM and get out of bed around 8 AM.  She does have a couple of nights a week where she will have difficulty falling asleep because she will feel quite restless.  When she falls asleep she may get up once or twice to go to the bathroom.  She is not taking any sleep aids.  She does drink about 3 cups of coffee in the morning.    She is a former smoker but quit in her early 30s.  Once or twice a year she will get a severe bronchitis where she has difficulty with severe cough and breathing.  Sometimes prednisone can be helpful.  Inhalers are usually hard to use when she is that sick.  She often has some postnasal drainage.  She was unable to tolerate nasal sprays well and they did not benefit much.  She is  taking daily cetirizine.    No problems with sleepwalking, sleep talking or dream enactment behavior.  No known family history of sleep apnea.    [place] Sleepiness Scale   Sitting and reading: Would never doze   Watching TV: Would never doze   Sitting, inactive in a public place (e.g. a theatre or a meeting): Would never doze   As a passenger in a car for an hour without a break: Slight chance of dozing   Lying down to rest in the afternoon when circumstances permit: Slight chance of dozing   Sitting and talking to someone: Would never doze   Sitting quietly after a lunch without alcohol: Would never doze   In a car, while stopped for a few minutes in traffic: Would never doze   Total score - [place]: 2   (Less than 10 normal)    Insomnia Severity Scale  HELENA Total Score: 8  Total score categories:  0-7 = No clinically significant insomnia   8-14 = Subthreshold insomnia   15-21 = Clinical insomnia (moderate severity)  22-28 = Clinical insomnia (severe)    STOP-BANG  Loud Snore   1  Excessively Tired/Sleepy   0  Observed apnea   1  Hypertension   1  BMI> 35 kg/m2   0  Age >50   1  Neck >16 in/40cm   ?  Male Gender   0  Total =   4  (0-2 low, 3-4 intermediate, 5-8 high risk of KENNETH)      Past Medical History:   Diagnosis Date     Chronic allergic conjunctivitis 01/29/2020     High cholesterol      Hypertension      Pityriasis rosea 01/29/2020     Seasonal allergic rhinitis due to pollen 03/27/2020       Allergies   Allergen Reactions     Codeine Unknown     Nausea and vomiting     Sulfa (Sulfonamide Antibiotics) [Sulfa Antibiotics] Unknown     Itching and hives       Current Outpatient Medications   Medication Sig Dispense Refill     cetirizine (ZYRTEC) 10 MG tablet [CETIRIZINE (ZYRTEC) 10 MG TABLET] Take 10 mg by mouth daily.       losartan (COZAAR) 100 MG tablet TAKE 1 TABLET(100 MG) BY MOUTH DAILY 90 tablet 2     simvastatin (ZOCOR) 40 MG tablet TAKE 1 TABLET(40 MG) BY MOUTH AT BEDTIME 90 tablet 3     METAMUCIL  FIBER PO Take 1 Dose by mouth daily       multivitamin therapeutic tablet [MULTIVITAMIN THERAPEUTIC TABLET] Take 1 tablet by mouth daily.       No current facility-administered medications for this visit.       Social History     Socioeconomic History     Marital status:      Spouse name: Travis     Number of children: 2     Years of education: 18     Highest education level: Bachelor's degree (e.g., BA, AB, BS)   Occupational History     Occupation: Retired   Tobacco Use     Smoking status: Former     Current packs/day: 1.00     Average packs/day: 1 pack/day for 15.0 years (15.0 ttl pk-yrs)     Types: Cigarettes     Passive exposure: Past     Smokeless tobacco: Never   Vaping Use     Vaping status: Never Used   Substance and Sexual Activity     Alcohol use: Yes     Alcohol/week: 5.0 standard drinks of alcohol     Types: 5 Cans of beer per week     Drug use: Not Currently     Sexual activity: Not on file     Comment: Hysterectomy   Other Topics Concern     Parent/sibling w/ CABG, MI or angioplasty before 65F 55M? Yes   Social History Narrative     Not on file     Social Determinants of Health     Financial Resource Strain: Low Risk  (2/2/2024)    Financial Resource Strain      Within the past 12 months, have you or your family members you live with been unable to get utilities (heat, electricity) when it was really needed?: No   Food Insecurity: Low Risk  (2/2/2024)    Food Insecurity      Within the past 12 months, did you worry that your food would run out before you got money to buy more?: No      Within the past 12 months, did the food you bought just not last and you didn t have money to get more?: No   Transportation Needs: Low Risk  (2/2/2024)    Transportation Needs      Within the past 12 months, has lack of transportation kept you from medical appointments, getting your medicines, non-medical meetings or appointments, work, or from getting things that you need?: No   Physical Activity: Not on file  "  Stress: Not on file   Social Connections: Not on file   Interpersonal Safety: Low Risk  (11/6/2023)    Interpersonal Safety      Do you feel physically and emotionally safe where you currently live?: Yes      Within the past 12 months, have you been hit, slapped, kicked or otherwise physically hurt by someone?: No      Within the past 12 months, have you been humiliated or emotionally abused in other ways by your partner or ex-partner?: No   Housing Stability: Low Risk  (2/2/2024)    Housing Stability      Do you have housing? : Yes      Are you worried about losing your housing?: No       Family History   Problem Relation Age of Onset     Heart Disease Mother      Hypertension Mother      Alcoholism Father      No Known Problems Sister      No Known Problems Brother      No Known Problems Brother      Diabetes Paternal Grandmother      Breast Cancer No family hx of          EXAM:  Ht 1.575 m (5' 2\")   Wt 68 kg (150 lb)   LMP  (LMP Unknown)   BMI 27.44 kg/m    GENERAL: Alert and no distress  EYES: Eyes grossly normal to inspection.  No discharge or erythema, or obvious scleral/conjunctival abnormalities.  RESP: No audible wheeze, cough, or visible cyanosis.    SKIN: Visible skin clear. No significant rash, abnormal pigmentation or lesions.  NEURO: Cranial nerves grossly intact.  Mentation and speech appropriate for age.  PSYCH: Appropriate affect, tone, and pace of words     No results found for: \"TSH\"    Chest x-ray from February 2024 is normal    ASSESSMENT:  63-year-old female with loud disruptive snoring, observed apneas, likely supine worsening.  She has a history of hypertension.  Overall her risk for obstructive sleep apnea is at least intermediate.  Identifying and treating sleep apnea is medically indicated.  She also has some mild motor restlessness symptoms couple of times a week.  This could improve if sleep disordered breathing was identified and treated.  She is a former smoker with recurrent " episodes of bronchitis.  Consider possible asthma or  COPD.  Airways disease can also be associated with psoriasis.    PLAN:  Reviewed the pathophysiology of obstructive sleep apnea as well as testing options, indications for treatment and treatment options.  Plan to proceed with home sleep apnea testing.  I will be contacting her with results via [employer] when they become available.  In the meantime, recommended watching for excessive caffeine could aggravate motor restlessness.  Also recommended comprehensive pulmonary function testing to evaluate for possible obstruction.  She is agreeable with this plan.      55 minutes spent by me on the date of the encounter doing chart review, history and exam, documentation and further activities per the note    Misty Fine M.D.  Pulmonary/Critical Care/Sleep Medicine    [facility]   [address], [address]   [address] [place] [address]. [place] [address]   Appointments: [phone]    The above note was dictated using voice recognition software and may include typographical errors. Please contact the author for any clarifications.            Again, thank you for allowing me to participate in the care of your patient.        Sincerely,        Misty Fine MD

## 2024-04-16 NOTE — PROGRESS NOTES
Virtual Visit Details    Type of service:  Video Visit   Video Start Time: 10:07 AM  Video End Time:10:39 AM    Originating Location (pt. Location): Home    Distant Location (provider location):  Off-site  Platform used for Video Visit: [facility]    Chief complaint: Consultation requested by Porter Morales DO for evaluation of loud disruptive snoring, possible apnea    History of Present Illness: 63-year-old female with history of hypertension describes loud disruptive snoring.  She has been told by her  about this on multiple occasions.  He has also described that she will have pauses in the snoring followed by gasping or choking sounds.  She thinks this is worse on her back.  She needs to sleep on her back because of back issues.  She has had some weight gain.  She is not sure how long this has been going on.  She does feel tired during the day but does not take naps.  She is not dozing off unintentionally.  She typically will get into bed around 10 to 10:30 PM and get out of bed around 8 AM.  She does have a couple of nights a week where she will have difficulty falling asleep because she will feel quite restless.  When she falls asleep she may get up once or twice to go to the bathroom.  She is not taking any sleep aids.  She does drink about 3 cups of coffee in the morning.    She is a former smoker but quit in her early 30s.  Once or twice a year she will get a severe bronchitis where she has difficulty with severe cough and breathing.  Sometimes prednisone can be helpful.  Inhalers are usually hard to use when she is that sick.  She often has some postnasal drainage.  She was unable to tolerate nasal sprays well and they did not benefit much.  She is taking daily cetirizine.    No problems with sleepwalking, sleep talking or dream enactment behavior.  No known family history of sleep apnea.    [place] Sleepiness Scale   Sitting and reading: Would never doze   Watching TV: Would never doze   Sitting,  inactive in a public place (e.g. a theatre or a meeting): Would never doze   As a passenger in a car for an hour without a break: Slight chance of dozing   Lying down to rest in the afternoon when circumstances permit: Slight chance of dozing   Sitting and talking to someone: Would never doze   Sitting quietly after a lunch without alcohol: Would never doze   In a car, while stopped for a few minutes in traffic: Would never doze   Total score - [place]: 2   (Less than 10 normal)    Insomnia Severity Scale  HELENA Total Score: 8  Total score categories:  0-7 = No clinically significant insomnia   8-14 = Subthreshold insomnia   15-21 = Clinical insomnia (moderate severity)  22-28 = Clinical insomnia (severe)    STOP-BANG  Loud Snore   1  Excessively Tired/Sleepy   0  Observed apnea   1  Hypertension   1  BMI> 35 kg/m2   0  Age >50   1  Neck >16 in/40cm   ?  Male Gender   0  Total =   4  (0-2 low, 3-4 intermediate, 5-8 high risk of KENNETH)      Past Medical History:   Diagnosis Date    Chronic allergic conjunctivitis 01/29/2020    High cholesterol     Hypertension     Pityriasis rosea 01/29/2020    Seasonal allergic rhinitis due to pollen 03/27/2020       Allergies   Allergen Reactions    Codeine Unknown     Nausea and vomiting    Sulfa (Sulfonamide Antibiotics) [Sulfa Antibiotics] Unknown     Itching and hives       Current Outpatient Medications   Medication Sig Dispense Refill    cetirizine (ZYRTEC) 10 MG tablet [CETIRIZINE (ZYRTEC) 10 MG TABLET] Take 10 mg by mouth daily.      losartan (COZAAR) 100 MG tablet TAKE 1 TABLET(100 MG) BY MOUTH DAILY 90 tablet 2    simvastatin (ZOCOR) 40 MG tablet TAKE 1 TABLET(40 MG) BY MOUTH AT BEDTIME 90 tablet 3    METAMUCIL FIBER PO Take 1 Dose by mouth daily      multivitamin therapeutic tablet [MULTIVITAMIN THERAPEUTIC TABLET] Take 1 tablet by mouth daily.       No current facility-administered medications for this visit.       Social History     Socioeconomic History    Marital status:       Spouse name: Travis    Number of children: 2    Years of education: 18    Highest education level: Bachelor's degree (e.g., BA, AB, BS)   Occupational History    Occupation: Retired   Tobacco Use    Smoking status: Former     Current packs/day: 1.00     Average packs/day: 1 pack/day for 15.0 years (15.0 ttl pk-yrs)     Types: Cigarettes     Passive exposure: Past    Smokeless tobacco: Never   Vaping Use    Vaping status: Never Used   Substance and Sexual Activity    Alcohol use: Yes     Alcohol/week: 5.0 standard drinks of alcohol     Types: 5 Cans of beer per week    Drug use: Not Currently    Sexual activity: Not on file     Comment: Hysterectomy   Other Topics Concern    Parent/sibling w/ CABG, MI or angioplasty before 65F 55M? Yes   Social History Narrative    Not on file     Social Determinants of Health     Financial Resource Strain: Low Risk  (2/2/2024)    Financial Resource Strain     Within the past 12 months, have you or your family members you live with been unable to get utilities (heat, electricity) when it was really needed?: No   Food Insecurity: Low Risk  (2/2/2024)    Food Insecurity     Within the past 12 months, did you worry that your food would run out before you got money to buy more?: No     Within the past 12 months, did the food you bought just not last and you didn t have money to get more?: No   Transportation Needs: Low Risk  (2/2/2024)    Transportation Needs     Within the past 12 months, has lack of transportation kept you from medical appointments, getting your medicines, non-medical meetings or appointments, work, or from getting things that you need?: No   Physical Activity: Not on file   Stress: Not on file   Social Connections: Not on file   Interpersonal Safety: Low Risk  (11/6/2023)    Interpersonal Safety     Do you feel physically and emotionally safe where you currently live?: Yes     Within the past 12 months, have you been hit, slapped, kicked or otherwise  "physically hurt by someone?: No     Within the past 12 months, have you been humiliated or emotionally abused in other ways by your partner or ex-partner?: No   Housing Stability: Low Risk  (2/2/2024)    Housing Stability     Do you have housing? : Yes     Are you worried about losing your housing?: No       Family History   Problem Relation Age of Onset    Heart Disease Mother     Hypertension Mother     Alcoholism Father     No Known Problems Sister     No Known Problems Brother     No Known Problems Brother     Diabetes Paternal Grandmother     Breast Cancer No family hx of          EXAM:  Ht 1.575 m (5' 2\")   Wt 68 kg (150 lb)   LMP  (LMP Unknown)   BMI 27.44 kg/m    GENERAL: Alert and no distress  EYES: Eyes grossly normal to inspection.  No discharge or erythema, or obvious scleral/conjunctival abnormalities.  RESP: No audible wheeze, cough, or visible cyanosis.    SKIN: Visible skin clear. No significant rash, abnormal pigmentation or lesions.  NEURO: Cranial nerves grossly intact.  Mentation and speech appropriate for age.  PSYCH: Appropriate affect, tone, and pace of words     No results found for: \"TSH\"    Chest x-ray from February 2024 is normal    ASSESSMENT:  63-year-old female with loud disruptive snoring, observed apneas, likely supine worsening.  She has a history of hypertension.  Overall her risk for obstructive sleep apnea is at least intermediate.  Identifying and treating sleep apnea is medically indicated.  She also has some mild motor restlessness symptoms couple of times a week.  This could improve if sleep disordered breathing was identified and treated.  She is a former smoker with recurrent episodes of bronchitis.  Consider possible asthma or  COPD.  Airways disease can also be associated with psoriasis.    PLAN:  Reviewed the pathophysiology of obstructive sleep apnea as well as testing options, indications for treatment and treatment options.  Plan to proceed with home sleep apnea " testing.  I will be contacting her with results via [facility] when they become available.  In the meantime, recommended watching for excessive caffeine could aggravate motor restlessness.  Also recommended comprehensive pulmonary function testing to evaluate for possible obstruction.  She is agreeable with this plan.      55 minutes spent by me on the date of the encounter doing chart review, history and exam, documentation and further activities per the note    Misty Fine M.D.  Pulmonary/Critical Care/Sleep Medicine    [facility]   Floor 1, [address]   [address] [place], [place] [address]. [place] [address]   Appointments: [phone]    The above note was dictated using voice recognition software and may include typographical errors. Please contact the author for any clarifications.

## 2024-04-16 NOTE — NURSING NOTE
Has patient had flu shot for current/most recent flu season? If so, when? Yes: 11/6/23    Is the patient currently in the state of [place]? YES    Visit mode:VIDEO    If the visit is dropped, the patient can be reconnected by: VIDEO VISIT: Text to cell phone:   Telephone Information:   Mobile [phone]       Will anyone else be joining the visit? NO  (If patient encounters technical issues they should call [phone]    How would you like to obtain your AVS? MyChart    Are changes needed to the allergy or medication list? Yes add to medication - B complex, daily     Are refills needed on medications prescribed by this physician? NO    Reason for visit: Consult    Isa TOLEDO

## 2024-04-30 ENCOUNTER — OFFICE VISIT (OUTPATIENT)
Dept: PULMONOLOGY | Facility: CLINIC | Age: 64
End: 2024-04-30
Attending: INTERNAL MEDICINE
Payer: MEDICARE

## 2024-04-30 DIAGNOSIS — R05.9 COUGH, UNSPECIFIED TYPE: ICD-10-CM

## 2024-04-30 DIAGNOSIS — Z87.891 FORMER SMOKER: ICD-10-CM

## 2024-04-30 LAB
DLCOCOR-%PRED-PRE: 116 %
DLCOCOR-PRE: 21.29 ML/MIN/MMHG
DLCOUNC-%PRED-PRE: 117 %
DLCOUNC-PRE: 21.42 ML/MIN/MMHG
DLCOUNC-PRED: 18.21 ML/MIN/MMHG
ERV-%PRED-PRE: 29 %
ERV-PRE: 0.28 L
ERV-PRED: 0.94 L
EXPTIME-PRE: 4.95 SEC
FEF2575-%PRED-POST: 123 %
FEF2575-%PRED-PRE: 103 %
FEF2575-POST: 2.26 L/SEC
FEF2575-PRE: 1.9 L/SEC
FEF2575-PRED: 1.83 L/SEC
FEFMAX-%PRED-PRE: 90 %
FEFMAX-PRE: 5.05 L/SEC
FEFMAX-PRED: 5.6 L/SEC
FEV1-%PRED-PRE: 102 %
FEV1-PRE: 2.06 L
FEV1FEV6-PRE: 80 %
FEV1FEV6-PRED: 80 %
FEV1FVC-PRE: 80 %
FEV1FVC-PRED: 80 %
FEV1SVC-PRE: 78 %
FEV1SVC-PRED: 69 %
FIFMAX-PRE: 4.69 L/SEC
FRCPLETH-%PRED-PRE: 116 %
FRCPLETH-PRE: 3.01 L
FRCPLETH-PRED: 2.58 L
FVC-%PRED-PRE: 101 %
FVC-PRE: 2.57 L
FVC-PRED: 2.53 L
HGB BLD-MCNC: 13.6 G/DL
IC-%PRED-PRE: 125 %
IC-PRE: 2.37 L
IC-PRED: 1.89 L
RVPLETH-%PRED-PRE: 143 %
RVPLETH-PRE: 2.72 L
RVPLETH-PRED: 1.89 L
TLCPLETH-%PRED-PRE: 117 %
TLCPLETH-PRE: 5.37 L
TLCPLETH-PRED: 4.56 L
VA-%PRED-PRE: 98 %
VA-PRE: 4.18 L
VC-%PRED-PRE: 90 %
VC-PRE: 2.65 L
VC-PRED: 2.92 L

## 2024-04-30 PROCEDURE — 94060 EVALUATION OF WHEEZING: CPT | Mod: 26 | Performed by: INTERNAL MEDICINE

## 2024-04-30 PROCEDURE — 85018 HEMOGLOBIN: CPT | Mod: QW | Performed by: INTERNAL MEDICINE

## 2024-04-30 PROCEDURE — 94726 PLETHYSMOGRAPHY LUNG VOLUMES: CPT | Mod: 26 | Performed by: INTERNAL MEDICINE

## 2024-04-30 PROCEDURE — 94729 DIFFUSING CAPACITY: CPT | Mod: 26 | Performed by: INTERNAL MEDICINE

## 2024-06-07 ENCOUNTER — MYC MEDICAL ADVICE (OUTPATIENT)
Dept: FAMILY MEDICINE | Facility: CLINIC | Age: 64
End: 2024-06-07

## 2024-06-07 ENCOUNTER — TELEPHONE (OUTPATIENT)
Dept: FAMILY MEDICINE | Facility: CLINIC | Age: 64
End: 2024-06-07

## 2024-06-07 NOTE — TELEPHONE ENCOUNTER
Patient Quality Outreach    Patient is due for the following:   Hypertension -  BP check    Next Steps:   Schedule a nurse only visit for B/P Check    Type of outreach:    Sent [employer] message.      Questions for provider review:    None           Manjinder Alanis Jr., MA  Chart routed to Care Team.

## 2024-07-08 NOTE — TELEPHONE ENCOUNTER
Patient Quality Outreach    Patient is due for the following:   Hypertension -  Hypertension follow-up visit    Next Steps:   Schedule a nurse only visit for B/P Check     Type of outreach:    Phone, left message for patient/parent to call back.    Next Steps:  Reach out within 90 days via Phone.    Max number of attempts reached: Yes. Will try again in 90 days if patient still on fail list.    Questions for provider review:    None           Manjinder Alanis Jr., MA

## 2024-07-11 ENCOUNTER — OFFICE VISIT (OUTPATIENT)
Dept: SLEEP MEDICINE | Facility: CLINIC | Age: 64
End: 2024-07-11
Attending: INTERNAL MEDICINE
Payer: MEDICARE

## 2024-07-11 DIAGNOSIS — G47.30 OBSERVED SLEEP APNEA: ICD-10-CM

## 2024-07-11 DIAGNOSIS — G47.33 OSA (OBSTRUCTIVE SLEEP APNEA): Primary | ICD-10-CM

## 2024-07-11 DIAGNOSIS — R06.83 LOUD SNORING: ICD-10-CM

## 2024-07-11 PROCEDURE — G0399 HOME SLEEP TEST/TYPE 3 PORTA: HCPCS | Mod: 52 | Performed by: INTERNAL MEDICINE

## 2024-07-11 ASSESSMENT — SLEEP AND FATIGUE QUESTIONNAIRES
HOW LIKELY ARE YOU TO NOD OFF OR FALL ASLEEP WHILE SITTING QUIETLY AFTER LUNCH WITHOUT ALCOHOL: SLIGHT CHANCE OF DOZING
HOW LIKELY ARE YOU TO NOD OFF OR FALL ASLEEP WHILE SITTING INACTIVE IN A PUBLIC PLACE: WOULD NEVER DOZE
HOW LIKELY ARE YOU TO NOD OFF OR FALL ASLEEP IN A CAR, WHILE STOPPED FOR A FEW MINUTES IN TRAFFIC: SLIGHT CHANCE OF DOZING
HOW LIKELY ARE YOU TO NOD OFF OR FALL ASLEEP WHILE SITTING AND TALKING TO SOMEONE: WOULD NEVER DOZE
HOW LIKELY ARE YOU TO NOD OFF OR FALL ASLEEP WHEN YOU ARE A PASSENGER IN A CAR FOR AN HOUR WITHOUT A BREAK: MODERATE CHANCE OF DOZING
HOW LIKELY ARE YOU TO NOD OFF OR FALL ASLEEP WHILE LYING DOWN TO REST IN THE AFTERNOON WHEN CIRCUMSTANCES PERMIT: MODERATE CHANCE OF DOZING
HOW LIKELY ARE YOU TO NOD OFF OR FALL ASLEEP WHILE SITTING AND READING: SLIGHT CHANCE OF DOZING
HOW LIKELY ARE YOU TO NOD OFF OR FALL ASLEEP WHILE WATCHING TV: SLIGHT CHANCE OF DOZING

## 2024-07-11 NOTE — PROGRESS NOTES
Pt is completing a home sleep test. Pt was instructed on how to put on the Noxturnal T3 device and associated equipment before going to bed and given the opportunity to practice putting it on before leaving the sleep center. Pt was reminded to bring the home sleep test kit back to the center tomorrow, at agreed upon time for download and reporting.   Neck circumference: 35 CM / 13.75 inches.

## 2024-07-12 ENCOUNTER — DOCUMENTATION ONLY (OUTPATIENT)
Dept: SLEEP MEDICINE | Facility: CLINIC | Age: 64
End: 2024-07-12
Attending: INTERNAL MEDICINE
Payer: MEDICARE

## 2024-07-12 NOTE — PROGRESS NOTES
HST POST-STUDY QUESTIONNAIRE    What time did you go to bed?  10:00  How long do you think it took to fall asleep?  30MINS  What time did you wake up to start the day?  515AM  Did you get up during the night at all?  YES  If you woke up, do you remember approximately what time(s)? 200PM  Did you have any difficulty with the equipment?  No  Did you us any type of treatment with this study?  None  Was the head of the bed elevated? No  Did you sleep in a recliner?  No  Did you stop using CPAP at least 3 days before this test?  NA  Any other information you'd like us to know?

## 2024-07-12 NOTE — PROGRESS NOTES
Pt returned HST device. It was downloaded and forwarded data to the clinical specialist for scoring.   Lynne Hartmann MA

## 2024-07-16 PROBLEM — G47.33 OSA (OBSTRUCTIVE SLEEP APNEA): Status: ACTIVE | Noted: 2024-07-16

## 2024-07-16 NOTE — PROGRESS NOTES
This HSAT was performed using a Noxturnal T3 device which recorded snore, sound, movement activity, body position, nasal pressure, oronasal thermal airflow, pulse, oximetry and both chest and abdominal respiratory effort. HSAT data was restricted to the time patient states they were in bed.     HSAT was scored using 1B 4% hypopnea rule.     HST AHI (Non-PAT): 23.7.  Snoring was reported as mild and moderate.  Time with SpO2 below 89% was 0.3 minutes.   Overall signal quality was fair     Pt will follow up with sleep provider to determine appropriate therapy.

## 2024-07-16 NOTE — PROCEDURES
"HOME SLEEP STUDY INTERPRETATION        Patient: Ashleigh Chirinos  MRN: [id number]  YOB: 1960  Study Date: 2024  PCP/Referring Provider: Porter Morales DO  Ordering Provider:   Misty Fine MD         Indications for Home Study: Ashleigh Chirinos is a 64 year old female with a history of hypertension who presents with symptoms suggestive of obstructive sleep apnea.    Estimated body mass index is 27.44 kg/m  as calculated from the following:    Height as of 24: 1.575 m (5' 2\").    Weight as of 24: 68 kg (150 lb).  Total score - [place]: 8 (2024 10:01 AM)  STOP-BAN/8        Data: A full night home sleep study was performed recording the standard physiologic parameters including body position, movement, sound, nasal pressure, thermal oral airflow, chest and abdominal movements with respiratory inductance plethysmography, and oxygen saturation by pulse oximetry. Pulse rate was estimated by oximetry recording. This study was considered adequate based on > 4 hours of quality oximetry and respiratory recording. As specified by the AASM Manual for the Scoring of Sleep and Associated events, version 2.3, Rule VIII.D 1B, 4% oxygen desaturation scoring for hypopneas is used as a standard of care on all home sleep apnea testing.        Analysis Time:  302 minutes        Respiration:   Sleep Associated Hypoxemia: sustained hypoxemia was not present. Baseline oxygen saturation was 96%.  Time with saturation less than or equal to 88% was 0.3 minutes. The lowest oxygen saturation was 87%.   Snoring: Snoring was present.  Respiratory events: The home study revealed a presence of 68 obstructive apneas and 10 mixed and central apneas. There were 41 hypopneas resulting in a combined apnea/hypopnea index [AHI] of 23.7 events per hour.  AHI was 46.5 per hour supine, NA per hour prone, 4 per hour on left side, and 6.6 per hour on right side.   Pattern: Excluding events noted above, respiratory " rate and pattern was Normal.      Position: Percent of time spent: supine - 44%, prone - 0%, on left - 25%, on right - 30%.      Heart Rate: By pulse oximetry normal rate was noted.       Assessment:   Overall moderate, supine predominant, obstructive sleep apnea with AHI 23.7 events per hour  Sleep associated hypoxemia was not present.    Recommendations:  Consider auto-CPAP at 5-15 cmH2O, polysomnography with full night PAP titration, or oral appliance therapy in combination with positional therapy restricting sleep to nonsupine positions .  Suggest optimizing sleep hygiene and avoiding sleep deprivation.  Weight management.        Diagnosis Code(s): Obstructive Sleep Apnea G47.33, Snoring R06.83    Electronically signed by: Misty Fine MD, July 16, 2024   Diplomate, American Board of Internal Medicine, Sleep Medicine

## 2024-08-02 ASSESSMENT — SLEEP AND FATIGUE QUESTIONNAIRES
HOW LIKELY ARE YOU TO NOD OFF OR FALL ASLEEP IN A CAR, WHILE STOPPED FOR A FEW MINUTES IN TRAFFIC: WOULD NEVER DOZE
HOW LIKELY ARE YOU TO NOD OFF OR FALL ASLEEP WHILE SITTING AND READING: SLIGHT CHANCE OF DOZING
HOW LIKELY ARE YOU TO NOD OFF OR FALL ASLEEP WHILE SITTING INACTIVE IN A PUBLIC PLACE: WOULD NEVER DOZE
HOW LIKELY ARE YOU TO NOD OFF OR FALL ASLEEP WHILE SITTING QUIETLY AFTER LUNCH WITHOUT ALCOHOL: WOULD NEVER DOZE
HOW LIKELY ARE YOU TO NOD OFF OR FALL ASLEEP WHEN YOU ARE A PASSENGER IN A CAR FOR AN HOUR WITHOUT A BREAK: SLIGHT CHANCE OF DOZING
HOW LIKELY ARE YOU TO NOD OFF OR FALL ASLEEP WHILE WATCHING TV: SLIGHT CHANCE OF DOZING
HOW LIKELY ARE YOU TO NOD OFF OR FALL ASLEEP WHILE LYING DOWN TO REST IN THE AFTERNOON WHEN CIRCUMSTANCES PERMIT: MODERATE CHANCE OF DOZING
HOW LIKELY ARE YOU TO NOD OFF OR FALL ASLEEP WHILE SITTING AND TALKING TO SOMEONE: WOULD NEVER DOZE

## 2024-08-07 ENCOUNTER — VIRTUAL VISIT (OUTPATIENT)
Dept: SLEEP MEDICINE | Facility: CLINIC | Age: 64
End: 2024-08-07
Payer: MEDICARE

## 2024-08-07 VITALS — WEIGHT: 140 LBS | BODY MASS INDEX: 25.76 KG/M2 | HEIGHT: 62 IN

## 2024-08-07 DIAGNOSIS — R06.83 LOUD SNORING: ICD-10-CM

## 2024-08-07 DIAGNOSIS — G47.33 OSA (OBSTRUCTIVE SLEEP APNEA): Primary | ICD-10-CM

## 2024-08-07 PROCEDURE — 99213 OFFICE O/P EST LOW 20 MIN: CPT | Mod: 95 | Performed by: INTERNAL MEDICINE

## 2024-08-07 ASSESSMENT — PAIN SCALES - GENERAL: PAINLEVEL: NO PAIN (0)

## 2024-08-07 NOTE — PROGRESS NOTES
Video-Visit Details    Type of service:  Video Visit    Video Visit Start Time:11:06 AM    Video End Time:11:21 AM    Originating Location (pt. Location): Home      Distant Location (provider location):  Off-site     Platform used for Video Visit: Harpal    Chief complaint: Follow-up on sleep apnea testing    History of Present Illness: 64-year-old female with history of loud disruptive snoring, observed apneas, hypertension and some bronchitis symptoms.  Since her last visit she underwent home sleep apnea testing as well as pulmonary function testing.  Pulmonary function testing was entirely normal.  Sleep apnea testing was reviewed in her detail today.  Her  was present during the visit as well.  Home sleep apnea test showed overall moderate obstructive sleep apnea with supine predominance.  There was mild sleep apnea sleeping laterally.  No significant hypoxemia.  Snoring was present worse on the back.     Patient is not very interested in CPAP therapy she would like to do something less invasive.  She does not think she could tolerate CPAP well.    [place] Sleepiness Scale  Total score - [place]: 5 (8/2/2024  9:52 AM)   (Less than 10 normal)    Insomnia Severity Scale  HELENA Total Score: 10  (normal 0-7, mild 8-14, moderate 15-21, severe 22-28)    Past Medical History:   Diagnosis Date    Chronic allergic conjunctivitis 01/29/2020    High cholesterol     Hypertension     Pityriasis rosea 01/29/2020    Seasonal allergic rhinitis due to pollen 03/27/2020       Allergies   Allergen Reactions    Codeine Unknown     Nausea and vomiting    Sulfa (Sulfonamide Antibiotics) [Sulfa Antibiotics] Unknown     Itching and hives       Current Outpatient Medications   Medication Sig Dispense Refill    cetirizine (ZYRTEC) 10 MG tablet [CETIRIZINE (ZYRTEC) 10 MG TABLET] Take 10 mg by mouth daily.      losartan (COZAAR) 100 MG tablet TAKE 1 TABLET(100 MG) BY MOUTH DAILY 90 tablet 2    multivitamin therapeutic tablet  [MULTIVITAMIN THERAPEUTIC TABLET] Take 1 tablet by mouth daily.      simvastatin (ZOCOR) 40 MG tablet TAKE 1 TABLET(40 MG) BY MOUTH AT BEDTIME 90 tablet 3     No current facility-administered medications for this visit.       Social History     Socioeconomic History    Marital status:      Spouse name: Travis    Number of children: 2    Years of education: 18    Highest education level: Bachelor's degree (e.g., BA, AB, BS)   Occupational History    Occupation: Retired   Tobacco Use    Smoking status: Former     Current packs/day: 1.00     Average packs/day: 1 pack/day for 15.0 years (15.0 ttl pk-yrs)     Types: Cigarettes     Passive exposure: Past    Smokeless tobacco: Never    Tobacco comments:     Quit age 28   Vaping Use    Vaping status: Never Used   Substance and Sexual Activity    Alcohol use: Yes     Alcohol/week: 5.0 standard drinks of alcohol     Types: 5 Cans of beer per week    Drug use: Not Currently    Sexual activity: Not on file     Comment: Hysterectomy   Other Topics Concern    Parent/sibling w/ CABG, MI or angioplasty before 65F 55M? Yes   Social History Narrative    Not on file     Social Determinants of Health     Financial Resource Strain: Low Risk  (2/2/2024)    Financial Resource Strain     Within the past 12 months, have you or your family members you live with been unable to get utilities (heat, electricity) when it was really needed?: No   Food Insecurity: Low Risk  (2/2/2024)    Food Insecurity     Within the past 12 months, did you worry that your food would run out before you got money to buy more?: No     Within the past 12 months, did the food you bought just not last and you didn t have money to get more?: No   Transportation Needs: Low Risk  (2/2/2024)    Transportation Needs     Within the past 12 months, has lack of transportation kept you from medical appointments, getting your medicines, non-medical meetings or appointments, work, or from getting things that you need?: No  "  Physical Activity: Not on file   Stress: Not on file   Social Connections: Not on file   Interpersonal Safety: Low Risk  (11/6/2023)    Interpersonal Safety     Do you feel physically and emotionally safe where you currently live?: Yes     Within the past 12 months, have you been hit, slapped, kicked or otherwise physically hurt by someone?: No     Within the past 12 months, have you been humiliated or emotionally abused in other ways by your partner or ex-partner?: No   Housing Stability: Low Risk  (2/2/2024)    Housing Stability     Do you have housing? : Yes     Are you worried about losing your housing?: No       Family History   Problem Relation Age of Onset    Heart Disease Mother     Hypertension Mother     Alcoholism Father     No Known Problems Sister     No Known Problems Brother     No Known Problems Brother     Diabetes Paternal Grandmother     Breast Cancer No family hx of            EXAM:  Ht 1.575 m (5' 2\")   Wt 63.5 kg (140 lb)   LMP  (LMP Unknown)   BMI 25.61 kg/m    GENERAL: Alert and no distress  EYES: Eyes grossly normal to inspection.  No discharge or erythema, or obvious scleral/conjunctival abnormalities.  RESP: No audible wheeze, cough, or visible cyanosis.    SKIN: Visible skin clear. No significant rash, abnormal pigmentation or lesions.  NEURO: Cranial nerves grossly intact.  Mentation and speech appropriate for age.  PSYCH: Appropriate affect, tone, and pace of words       HSAT 7/11/2024  Weight 150 lbs BMI 27.4  AHI 23.7 (Supine AHI 46.5) Lowest O2 Sat 87%    Pulmonary function testing from 4/30/2024 reviewed: Normal pulmonary function without significant bronchodilator response    ASSESSMENT:  64-year-old female with snoring, overall moderate supine predominant obstructive sleep apnea, hypertension.  Treatment of moderate obstructive sleep apnea is medically indicated.      PLAN:  We reviewed treatment options today and plan is to try oral appliance therapy with possible positional " therapy as well.  Referrals generated.  She should follow-up with me in 6 months or so and we will assess effectiveness with home sleep apnea testing if ready at that time.  In meantime, continue efforts at weight management.  See instructions for further details of counseling provided.      21 minutes spent by me on the date of the encounter doing chart review, history and exam, documentation and further activities per the note    Misty Fine M.D.  Pulmonary/Critical Care/Sleep Medicine    [facility]   Floor 1, [address]   [address] [place], [place] [address]. [place] [address]   Appointments: [phone]    The above note was dictated using voice recognition software and may include typographical errors. Please contact the author for any clarifications.

## 2024-08-07 NOTE — NURSING NOTE
Current patient location: [address] [place] [id number]    Is the patient currently in the state of [place]? YES    Visit mode:VIDEO    If the visit is dropped, the patient can be reconnected by: VIDEO VISIT: Text to cell phone:   Telephone Information:   Mobile [phone]       Will anyone else be joining the visit? NO  (If patient encounters technical issues they should call [phone]    How would you like to obtain your AVS? MyChart    Are changes needed to the allergy or medication list? Pt stated no med changes    Are refills needed on medications prescribed by this physician? NO    Rooming Documentation:  Assigned questionnaire(s) completed      Reason for visit: RECHECK    Adri MCMILLANF

## 2024-08-07 NOTE — PATIENT INSTRUCTIONS
BESIDES CPAP, WHAT OTHER THERAPIES ARE THERE?     Positioning Device  Positioning devices are generally used when sleep apnea is mild and only occurs on your back.This example shows a pillow that straps around the waist. It may be appropriate for those whose sleep study shows milder sleep apnea that occurs primarily when lying flat on one's back. Preliminary studies have shown benefit but effectiveness at home may need to be verified by a home sleep test. These devices are generally not covered by medical insurance.  Examples of devices that maintain sleeping on the back to prevent snoring and mild sleep apnea.     Belt type body positioner  http://[employer]/        Electronic reminder  http://nightshifttherapy.com/         Oral Appliance  What is oral appliance therapy?  An oral appliance device fits on your teeth at night like a retainer used after having braces. The device is made by a specialized dentist and requires several visits over 1-2 months before a manufactured device is made to fit your teeth and is adjusted to prevent your sleep apnea. Once an oral device is working properly, snoring should be improved. A home sleep test may be recommended at that time if to determine whether the sleep apnea is adequately treated.        Some things to remember:  -Oral devices are often, but not always, covered by your medical insurance. Be sure to check with your insurance provider.   -If you are referred for oral therapy, you will be given a list of specialized dentists to consider or you may choose to visit the Web site of the American Academy of Dental Sleep Medicine  -Oral devices are less likely to work if you have severe sleep apnea or are extremely overweight.      More detailed information  An oral appliance is a small acrylic device that fits over the upper and lower teeth  (similar to a retainer or a mouth guard). This device slightly moves jaw forward, which moves the base of the tongue forward, opens  the airway, improves breathing for effective treat snoring and obstructive sleep apnea in perhaps 7 out of 10 people .  The best working devices are custom-made by a dental device  after a mold is made of the teeth 1, 2, 3.  When is an oral appliance indicated?  Oral appliance therapy is recommended as a first-line treatment for patients with primary snoring, mild sleep apnea, and for patients with moderate sleep apnea who prefer appliance therapy to use of CPAP4, 5. Severity of sleep apnea is determined by sleep testing and is based on the number of respiratory events per hour of sleep.   How successful is oral appliance therapy?  The success rate of oral appliance therapy in patients with mild sleep apnea is 75-80% while in patients with moderate sleep apnea it is 50-70%. The chance of success in patients with severe sleep apnea is 40-50%. The research also shows that oral appliances have a beneficial effect on the cardiovascular health of KENNETH patients at the same magnitude as CPAP therapy7.  Oral appliances should be a second-line treatment in cases of severe sleep apnea, but if not completely successful then a combination therapy utilizing CPAP plus oral appliance therapy may be effective. Oral appliances tend to be effective in a broad range of patients although studies show that the patients who have the highest success are females, younger patients, those with milder disease, and less severe obesity. 3, 6.   Finding a dentist that practices dental sleep medicine  Specific training is available through the American Academy of Dental Sleep Medicine for dentists interested in working in the field of sleep. To find a dentist who is educated in the field of sleep and the use of oral appliances, near you, visit the Web site of the American Academy of Dental Sleep Medicine.     References  1. lizette Addison al. Objectively measured vs self-reported compliance during oral appliance therapy for  sleep-disordered breathing. Chest 2013; 144(5): 1232-0887.  2. Mila, et al. Objective measurement of compliance during oral appliance therapy for sleep-disordered breathing. Thorax 2013; 68(1): 91-96.  3. Victorina et al. Mandibular advancement devices in 620 men and women with KENNETH and snoring: tolerability and predictors of treatment success. Chest 2004; 125: 9306-0427.  4. Mario et al. Oral appliances for snoring and KENNETH: a review. Sleep 2006; 29: 244-262.  5. Gato et al. Oral appliance treatment for KENNETH: an update. J Clin Sleep Med 2014; 10(2): 215-227.  6. Ramesh et al. Predictors of OSAH treatment outcome. J Dent Res 2007; 86: 1358-4415.      [facility]  Search engine: https://Docebo.aadKaprica Security.org/members/directory/search_bootstrap.php?org_id=ADSM&   Certified in Dental Sleep Medicine    Thee Marcail  Degree: RICH  [address]  [address]  [place] [address]  Professional Phone: [phone]  Website: [email or web address]    Daniele Crespo  Degree: RICH  [facility]  [address]  [address]  [place] [address]  Professional Phone: [phone]: [phone]    [address]  [address]  [place] [address]  Appointments: [phone]  Fax: [phone]    Ivana Cazares  [facility]  [address]  [place] [address]  Professional Phone: [phone]  Website: [email or web address]      Fabian Jorgensen   [facility] [employer]   Degree: MD RICH   [address] [place] [address]  [address]  [place] [address]  Appointments: [phone]    Remi Cordero  Degree: RICH  [address] [place] [address]  [address]  [place] [address]  Professional Phone: [phone]  Fax: [phone]    Shawn Cantrell  Degree: DDS  [facility]  [address]  [address]  [place] [address]  Professional Phone: [phone]  Website:  [email or web address]      Blankenship Milenajoann  [facility]-you should verify insurance coverage  [address] [address], [place] [address]  Professional Phone: [phone]  Website: [email or web address]      Heather Kolb--DOES NOT ACCEPT INSURANCE  Degree: LAMONTS--you should verify insurance coverage  [facility]  [address] [place] [address]  Professional Phone: [phone]    Ivon Jj  Degree: DDS, PhD  [facility]  [address] [place] [address]   Appointments: [phone]   Fax: [phone]     Howie Benoit- [facility]  Degree: RICH  [address] [place] [address]  Professional Phone: [phone]  Fax: [phone]  Website: [email or web address]      Monty Gallardo  Degree: RICH  [facility]  [address] [place] [address]    Mariona Mulet Pradera  Degree: MS RICH  [facility]  [address] [place] [address]  Professional Phone: [phone]      Clarissa Brand  Degree: MS RICH  [facility]  [address]  [place] [address]  Professional Phone: [phone]    Kendra Norris  Degree: DDS  [facility]  [address] [place] [address]  Appointments: [phone]    Alfie Mclain  Degree: DDS  [facility]  [address] [place] [address]  Professional Phone: [phone]  Website: [email or web address]      Issac Kelley  Degree: RICH  [facility]  [address] [place] [address]  Professional Phone: [phone]  Fax: [phone]      Dawood Ponce  Degree: DDS  [facility]  [address]  [address]  [place] [address]    Lazarus Knapp   Degree: RICH   [facility]   [address]   [address]  [place]  [address]  Phone [phone]  Website: [email or web address]    Vannesaadrienne Kelly   Degree: DDS   [address]  [address]  [place] [address]  Appointments: [phone]    Rachael Soto   Degree: DDS   [address] [place] [address]   Appointments: [phone]    Clay Baxter   Degree: DDS   [address] [place] [address]   Appointments: [phone]    Porter Mann   Degree: DDS   [address] [place] [address]   Appointments: [phone]    Wilfred Perez   Degree: DDS   [facility], [address]   [place] [address]   Appointments: [phone]    Cindy Bradford   Degree: DDS  [address] [place] [address]  Appointments: [phone]    Andrew Carrell   [address] [place] [address]  Appointments: [phone]    Katarina Warner   Degree: DDS   [facility]   [address] [place] [address]   Appointments: [phone]    Edison Church   Degree: DDS   [address] [place] [address]   Appointments: [phone]    Vitor Santos-   [facility]    Degree: DDS  [address] [place] [address] [address]  [place] [address]  Appointments: [phone]    Evgeny Rausch   Degree: DDS   [facility]   [address]  [address]  [place] [address]  Appointment: [phone]    Nkechi Girl   Degree: MIS, MS, FAAOP   [facility]  [address]   [address]  [place] [address]  Appointments: [phone]    Fabian Reynolds   Degree: DDS   [address] [place] [address]   Appointments: [phone]   Fax: [phone]        ACCEPT MEDICARE  Moris Miller, DDS  [address] [facility], [place] [address]  [phone]; [phone] (fax)  Ideal Me    Jose Adair DDS, MS   [facility] [address]   [address]   [place] [address]   Appointments: [phone]   Fax: [phone]     Richi  Shane   Degree: DMD, MSD   [facility]   [address]  [address]  [place] [address]  Appointments: [phone]  Fax: [phone]      ADDITIONAL PROVIDERS    Jesus Escalante DDS    [facility]   [facility]  [address] [address]  Appointments: [phone]     [facility]  [address]  [place] [address]  Appointments: [phone]  Fax- [phone]    Izabel Ho   Degree: DDS   [facility]    [address]  [address]  [place] [address]  Appointments: [phone]

## 2024-08-21 DIAGNOSIS — I10 ESSENTIAL HYPERTENSION: ICD-10-CM

## 2024-08-21 DIAGNOSIS — E78.5 HYPERLIPIDEMIA, UNSPECIFIED HYPERLIPIDEMIA TYPE: ICD-10-CM

## 2024-08-21 RX ORDER — LOSARTAN POTASSIUM 100 MG/1
TABLET ORAL
Qty: 90 TABLET | Refills: 1 | Status: SHIPPED | OUTPATIENT
Start: 2024-08-21

## 2024-08-21 RX ORDER — SIMVASTATIN 40 MG
TABLET ORAL
Qty: 90 TABLET | Refills: 0 | Status: SHIPPED | OUTPATIENT
Start: 2024-08-21

## 2024-11-19 DIAGNOSIS — E78.5 HYPERLIPIDEMIA, UNSPECIFIED HYPERLIPIDEMIA TYPE: ICD-10-CM

## 2024-11-19 RX ORDER — SIMVASTATIN 40 MG
TABLET ORAL
Qty: 90 TABLET | Refills: 0 | Status: SHIPPED | OUTPATIENT
Start: 2024-11-19

## 2024-11-27 ENCOUNTER — OFFICE VISIT (OUTPATIENT)
Dept: FAMILY MEDICINE | Facility: CLINIC | Age: 64
End: 2024-11-27
Attending: FAMILY MEDICINE
Payer: COMMERCIAL

## 2024-11-27 VITALS
RESPIRATION RATE: 12 BRPM | HEART RATE: 62 BPM | DIASTOLIC BLOOD PRESSURE: 78 MMHG | TEMPERATURE: 97.6 F | BODY MASS INDEX: 27.94 KG/M2 | SYSTOLIC BLOOD PRESSURE: 136 MMHG | HEIGHT: 62 IN | OXYGEN SATURATION: 97 % | WEIGHT: 151.8 LBS

## 2024-11-27 DIAGNOSIS — Z00.00 ENCOUNTER FOR MEDICARE ANNUAL WELLNESS EXAM: Primary | ICD-10-CM

## 2024-11-27 DIAGNOSIS — I10 ESSENTIAL HYPERTENSION: ICD-10-CM

## 2024-11-27 DIAGNOSIS — Z12.31 VISIT FOR SCREENING MAMMOGRAM: ICD-10-CM

## 2024-11-27 DIAGNOSIS — E78.5 HYPERLIPIDEMIA, UNSPECIFIED HYPERLIPIDEMIA TYPE: ICD-10-CM

## 2024-11-27 DIAGNOSIS — Z23 NEED FOR VACCINATION: ICD-10-CM

## 2024-11-27 LAB
ALT SERPL W P-5'-P-CCNC: 27 U/L (ref 0–50)
ANION GAP SERPL CALCULATED.3IONS-SCNC: 9 MMOL/L (ref 7–15)
BUN SERPL-MCNC: 18 MG/DL (ref 8–23)
CALCIUM SERPL-MCNC: 9.7 MG/DL (ref 8.8–10.4)
CHLORIDE SERPL-SCNC: 104 MMOL/L (ref 98–107)
CHOLEST SERPL-MCNC: 208 MG/DL
CREAT SERPL-MCNC: 0.71 MG/DL (ref 0.51–0.95)
EGFRCR SERPLBLD CKD-EPI 2021: >90 ML/MIN/1.73M2
FASTING STATUS PATIENT QL REPORTED: YES
FASTING STATUS PATIENT QL REPORTED: YES
GLUCOSE SERPL-MCNC: 97 MG/DL (ref 70–99)
HCO3 SERPL-SCNC: 28 MMOL/L (ref 22–29)
HDLC SERPL-MCNC: 67 MG/DL
LDLC SERPL CALC-MCNC: 106 MG/DL
NONHDLC SERPL-MCNC: 141 MG/DL
POTASSIUM SERPL-SCNC: 4.8 MMOL/L (ref 3.4–5.3)
SODIUM SERPL-SCNC: 141 MMOL/L (ref 135–145)
TRIGL SERPL-MCNC: 175 MG/DL

## 2024-11-27 PROCEDURE — 36415 COLL VENOUS BLD VENIPUNCTURE: CPT | Performed by: FAMILY MEDICINE

## 2024-11-27 PROCEDURE — 80061 LIPID PANEL: CPT | Performed by: FAMILY MEDICINE

## 2024-11-27 PROCEDURE — 84460 ALANINE AMINO (ALT) (SGPT): CPT | Performed by: FAMILY MEDICINE

## 2024-11-27 PROCEDURE — 80048 BASIC METABOLIC PNL TOTAL CA: CPT | Performed by: FAMILY MEDICINE

## 2024-11-27 SDOH — HEALTH STABILITY: PHYSICAL HEALTH: ON AVERAGE, HOW MANY DAYS PER WEEK DO YOU ENGAGE IN MODERATE TO STRENUOUS EXERCISE (LIKE A BRISK WALK)?: 5 DAYS

## 2024-11-27 ASSESSMENT — SOCIAL DETERMINANTS OF HEALTH (SDOH): HOW OFTEN DO YOU GET TOGETHER WITH FRIENDS OR RELATIVES?: ONCE A WEEK

## 2024-11-27 NOTE — PATIENT INSTRUCTIONS
Patient Education   Preventive Care Advice   This is general advice given by our system to help you stay healthy. However, your care team may have specific advice just for you. Please talk to your care team about your preventive care needs.  Nutrition  Eat 5 or more servings of fruits and vegetables each day.  Try wheat bread, brown rice and whole grain pasta (instead of white bread, rice, and pasta).  Get enough calcium and vitamin D. Check the label on foods and aim for 100% of the RDA (recommended daily allowance).  Lifestyle  Exercise at least 150 minutes each week  (30 minutes a day, 5 days a week).  Do muscle strengthening activities 2 days a week. These help control your weight and prevent disease.  No smoking.  Wear sunscreen to prevent skin cancer.  Have a dental exam and cleaning every 6 months.  Yearly exams  See your health care team every year to talk about:  Any changes in your health.  Any medicines your care team has prescribed.  Preventive care, family planning, and ways to prevent chronic diseases.  Shots (vaccines)   HPV shots (up to age 26), if you've never had them before.  Hepatitis B shots (up to age 59), if you've never had them before.  COVID-19 shot: Get this shot when it's due.  Flu shot: Get a flu shot every year.  Tetanus shot: Get a tetanus shot every 10 years.  Pneumococcal, hepatitis A, and RSV shots: Ask your care team if you need these based on your risk.  Shingles shot (for age 50 and up)  General health tests  Diabetes screening:  Starting at age 35, Get screened for diabetes at least every 3 years.  If you are younger than age 35, ask your care team if you should be screened for diabetes.  Cholesterol test: At age 39, start having a cholesterol test every 5 years, or more often if advised.  Bone density scan (DEXA): At age 50, ask your care team if you should have this scan for osteoporosis (brittle bones).  Hepatitis C: Get tested at least once in your life.  STIs (sexually  transmitted infections)  Before age 24: Ask your care team if you should be screened for STIs.  After age 24: Get screened for STIs if you're at risk. You are at risk for STIs (including HIV) if:  You are sexually active with more than one person.  You don't use condoms every time.  You or a partner was diagnosed with a sexually transmitted infection.  If you are at risk for HIV, ask about PrEP medicine to prevent HIV.  Get tested for HIV at least once in your life, whether you are at risk for HIV or not.  Cancer screening tests  Cervical cancer screening: If you have a cervix, begin getting regular cervical cancer screening tests starting at age 21.  Breast cancer scan (mammogram): If you've ever had breasts, begin having regular mammograms starting at age 40. This is a scan to check for breast cancer.  Colon cancer screening: It is important to start screening for colon cancer at age 45.  Have a colonoscopy test every 10 years (or more often if you're at risk) Or, ask your provider about stool tests like a FIT test every year or Cologuard test every 3 years.  To learn more about your testing options, visit:   .  For help making a decision, visit:   https://bit.ly/ps85274.  Prostate cancer screening test: If you have a prostate, ask your care team if a prostate cancer screening test (PSA) at age 55 is right for you.  Lung cancer screening: If you are a current or former smoker ages 50 to 80, ask your care team if ongoing lung cancer screenings are right for you.  For informational purposes only. Not to replace the advice of your health care provider. Copyright   2023 [employer]. All rights reserved. Clinically reviewed by the [facility]itions Program. [employer] 598369 - REV 01/24.

## 2024-11-27 NOTE — PROGRESS NOTES
"Pre[facility]ive Care Visit  [facility]  Nov 27, 2024      Assessment & Plan   Problem List Items Addressed This Visit       Essential hypertension     Controlled on current dosing of losartan 100 mg daily.  Will continue current dosing.         Relevant Orders    BASIC METABOLIC PANEL    Hyperlipidemia     Has been well-controlled on simvastatin 40 mg.  Will recheck levels and adjust as needed to optimize risk reduction.         Relevant Orders    ALT    Lipid panel reflex to direct LDL Fasting     Other Visit Diagnoses       Encounter for Medicare annual wellness exam    -  Primary    Relevant Orders    REVIEW OF HEALTH MAINTENANCE PROTOCOL ORDERS (Completed)    PRIMARY CARE FOLLOW-UP SCHEDULING    Visit for screening mammogram        Relevant Orders    MA Screening Bilateral w/ Stoney    Need for vaccination        Relevant Orders    INFLUENZA HIGH DOSE, TRIVALENT, PF (FLUZONE) (Completed)    COVID-19 12+ (PFIZER) (Completed)             Patient has been advised of split billing requirements and indicates understanding: Yes       BMI  Estimated body mass index is 27.76 kg/m  as calculated from the following:    Height as of this encounter: 1.575 m (5' 2\").    Weight as of this encounter: 68.9 kg (151 lb 12.8 oz).   Weight management plan: Discussed healthy diet and exercise guidelines    Counseling  Appropriate preventive services were addressed with this patient via screening, questionnaire, or discussion as appropriate for fall prevention, nutrition, physical activity, Tobacco-use cessation, social engagement, weight loss and cognition.  Checklist reviewing preventive services available has been given to the patient.  Reviewed patient's diet, addressing concerns and/or questions.     Regular exercise  See Patient Instructions      Janes Sotelo is a 64 year old, presenting for the following:  Wellness Visit        11/27/2024    10:29 AM "   Additional Questions   Roomed by CRISTINO Alanis   Accompanied by Self         11/27/2024    10:29 AM   Patient Reported Additional Medications   Patient reports taking the following new medications N/A     Denies any chest pain, shortness of breath, dyspnea exertion, palpitations, nausea or vomiting.  Denies any changes in vision or hearing, or urinary or bowel habits.          Health Care Directive  Patient does not have a Health Care Directive: Discussed advance care planning with patient; however, patient declined at this time.      11/27/2024   General Health   How would you rate your overall physical health? Good   Feel stress (tense, anxious, or unable to sleep) Only a little      (!) STRESS CONCERN      11/27/2024   Nutrition   Diet: Regular (no restrictions)            11/27/2024   Exercise   Days per week of moderate/strenous exercise 5 days            11/27/2024   Social Factors   Frequency of gathering with friends or relatives Once a week   Worry food won't last until get money to buy more No   Food not last or not have enough money for food? No   Do you have housing? (Housing is defined as stable permanent housing and does not include staying ouside in a car, in a tent, in an abandoned building, in an overnight shelter, or couch-surfing.) Yes   Are you worried about losing your housing? No   Lack of transportation? No   Unable to get utilities (heat,electricity)? No            11/27/2024   Fall Risk   Fallen 2 or more times in the past year? No    Trouble with walking or balance? No        Patient-reported          11/27/2024   Activities of Daily Living- Home Safety   Needs help with the following daily activites None of the above   Safety concerns in the home None of the above            11/27/2024   Dental   Dentist two times every year? Yes            11/27/2024   Hearing Screening   Hearing concerns? None of the above            11/27/2024   Driving Risk Screening   Patient/family members have concerns  about driving No            11/27/2024   General Alertness/Fatigue Screening   Have you been more tired than usual lately? No            11/27/2024   Urinary Incontinence Screening   Bothered by leaking urine in past 6 months No            11/27/2024   TB Screening   Were you born outside of the [place]? No            Today's PHQ-2 Score:       11/27/2024    10:09 AM   PHQ-2 ( 1999 Pf[employer])   Q1: Little interest or pleasure in doing things 0    Q2: Feeling down, depressed or hopeless 0    PHQ-2 Score 0    Q1: Little interest or pleasure in doing things Not at all   Q2: Feeling down, depressed or hopeless Not at all   PHQ-2 Score 0       Patient-reported           11/27/2024   Substance Use   Alcohol more than 3/day or more than 7/wk No   Do you have a current opioid prescription? No   How severe/bad is pain from 1 to 10? 0/10 (No Pain)   Do you use any other substances recreationally? No        Social History     Tobacco Use    Smoking status: Former     Current packs/day: 1.00     Average packs/day: 1 pack/day for 15.0 years (15.0 ttl pk-yrs)     Types: Cigarettes     Passive exposure: Past    Smokeless tobacco: Never    Tobacco comments:     Quit age 28   Vaping Use    Vaping status: Never Used   Substance Use Topics    Alcohol use: Yes     Alcohol/week: 5.0 standard drinks of alcohol     Types: 5 Cans of beer per week    Drug use: Not Currently           11/3/2023   LAST FHS-7 RESULTS   1st degree relative breast or ovarian cancer No   Any relative bilateral breast cancer No   Any male have breast cancer No   Any ONE woman have BOTH breast AND ovarian cancer No   Any woman with breast cancer before 50yrs No   2 or more relatives with breast AND/OR ovarian cancer No   2 or more relatives with breast AND/OR bowel cancer No           Mammogram Screening - Mammogram every 1-2 years updated in Health Maintenance based on mutual decision making      History of abnormal Pap smear: Status post hysterectomy with removal of  "cervix and no history of CIN2 or greater or cervical cancer. Health Maintenance and Surgical History updated.       ASCVD Risk   The 10-year ASCVD risk score (Tay LOVELACE, et al., 2019) is: 8.5%    Values used to calculate the score:      Age: 64 years      Sex: Female      Is Non- : No      Diabetic: No      Tobacco smoker: No      Systolic Blood Pressure: 136 mmHg      Is BP treated: Yes      HDL Cholesterol: 63 mg/dL      Total Cholesterol: 184 mg/dL          Reviewed and updated as needed this visit by Provider   Tobacco  Allergies  Meds  Problems  Med Hx  Surg Hx  Fam Hx            Current providers sharing in care for this patient include:  Patient Care Team:  Porter Morales DO as PCP - General (Family Practice)  Porter Morales DO as Assigned PCP  Misty Fine MD as Assigned Pulmonology Provider    The following health maintenance items are reviewed in Epic and correct as of today:  Health Maintenance   Topic Date Due    ALT  11/06/2024    BMP  11/06/2024    LIPID  11/06/2024    MAMMO SCREENING  11/03/2024    COLORECTAL CANCER SCREENING  07/20/2025    MEDICARE ANNUAL WELLNESS VISIT  11/27/2025    ANNUAL REVIEW OF HM ORDERS  11/27/2025    FALL RISK ASSESSMENT  11/27/2025    DEXA  12/13/2025    DTAP/TDAP/TD IMMUNIZATION (2 - Td or Tdap) 12/29/2025    GLUCOSE  11/06/2026    ADVANCE CARE PLANNING  11/06/2028    RSV VACCINE (1 - 1-dose 75+ series) 06/13/2033    HEPATITIS C SCREENING  Completed    PHQ-2 (once per calendar year)  Completed    INFLUENZA VACCINE  Completed    Pneumococcal Vaccine: 65+ Years  Completed    ZOSTER IMMUNIZATION  Completed    COVID-19 Vaccine  Completed    HPV IMMUNIZATION  Aged Out    MENINGITIS IMMUNIZATION  Aged Out    RSV MONOCLONAL ANTIBODY  Aged Out    LUNG CANCER SCREENING  Discontinued          Objective    Exam  /78   Pulse 62   Temp 97.6  F (36.4  C) (Oral)   Resp 12   Ht 1.575 m (5' 2\")   Wt 68.9 kg (151 " "lb 12.8 oz)   LMP  (LMP Unknown)   SpO2 97%   Breastfeeding No   BMI 27.76 kg/m     Estimated body mass index is 27.76 kg/m  as calculated from the following:    Height as of this encounter: 1.575 m (5' 2\").    Weight as of this encounter: 68.9 kg (151 lb 12.8 oz).    Physical Exam  Vitals and nursing note reviewed.   Constitutional:       General: She is not in acute distress.     Appearance: Normal appearance.   HENT:      Head: Normocephalic and atraumatic.      Right Ear: Tympanic membrane, ear canal and external ear normal.      Left Ear: Tympanic membrane, ear canal and external ear normal.      Nose: Nose normal.      Mouth/Throat:      Mouth: Mucous membranes are moist.      Pharynx: Oropharynx is clear. No oropharyngeal exudate.   Eyes:      General: No scleral icterus.     Extraocular Movements: Extraocular movements intact.      Conjunctiva/sclera: Conjunctivae normal.   Neck:      Vascular: No carotid bruit.   Cardiovascular:      Rate and Rhythm: Normal rate and regular rhythm.      Pulses: Normal pulses.      Heart sounds: Normal heart sounds. No murmur heard.     No friction rub. No gallop.   Pulmonary:      Effort: Pulmonary effort is normal.      Breath sounds: Normal breath sounds. No wheezing, rhonchi or rales.   Musculoskeletal:         General: No swelling. Normal range of motion.      Cervical back: Normal range of motion.      Right lower leg: No edema.      Left lower leg: No edema.   Skin:     General: Skin is warm and dry.      Capillary Refill: Capillary refill takes less than 2 seconds.      Findings: No rash.   Neurological:      General: No focal deficit present.      Mental Status: She is alert and oriented to person, place, and time.      Cranial Nerves: No cranial nerve deficit.      Gait: Gait is intact. Gait normal.      Deep Tendon Reflexes: Reflexes normal.      Reflex Scores:       Bicep reflexes are 2+ on the right side and 2+ on the left side.       Patellar reflexes are " 2+ on the right side and 2+ on the left side.  Psychiatric:         Mood and Affect: Mood normal.         Thought Content: Thought content normal.             11/27/2024   Mini Cog   Clock Draw Score 2 Normal   3 Item Recall 3 objects recalled   Mini Cog Total Score 5                 Signed Electronically by: Porter Morales,

## 2024-11-27 NOTE — ASSESSMENT & PLAN NOTE
Has been well-controlled on simvastatin 40 mg.  Will recheck levels and adjust as needed to optimize risk reduction.

## 2024-12-03 ENCOUNTER — ANCILLARY PROCEDURE (OUTPATIENT)
Dept: MAMMOGRAPHY | Facility: HOSPITAL | Age: 64
End: 2024-12-03
Attending: FAMILY MEDICINE
Payer: MEDICARE

## 2024-12-03 DIAGNOSIS — Z12.31 VISIT FOR SCREENING MAMMOGRAM: ICD-10-CM

## 2024-12-03 PROCEDURE — 77063 BREAST TOMOSYNTHESIS BI: CPT

## 2025-02-12 ENCOUNTER — MYC MEDICAL ADVICE (OUTPATIENT)
Dept: FAMILY MEDICINE | Facility: CLINIC | Age: 65
End: 2025-02-12
Payer: MEDICARE